# Patient Record
Sex: MALE | Race: WHITE | Employment: UNEMPLOYED | URBAN - METROPOLITAN AREA
[De-identification: names, ages, dates, MRNs, and addresses within clinical notes are randomized per-mention and may not be internally consistent; named-entity substitution may affect disease eponyms.]

---

## 2018-03-31 ENCOUNTER — HOSPITAL ENCOUNTER (EMERGENCY)
Age: 48
Discharge: HOME | End: 2018-03-31
Payer: COMMERCIAL

## 2018-03-31 VITALS
DIASTOLIC BLOOD PRESSURE: 93 MMHG | BODY MASS INDEX: 42.09 KG/M2 | WEIGHT: 285 LBS | RESPIRATION RATE: 16 BRPM | HEART RATE: 87 BPM | TEMPERATURE: 98.2 F | SYSTOLIC BLOOD PRESSURE: 145 MMHG | OXYGEN SATURATION: 95 %

## 2018-03-31 DIAGNOSIS — H66.001 ACUTE SUPPURATIVE OTITIS MEDIA OF RIGHT EAR WITHOUT SPONTANEOUS RUPTURE OF TYMPANIC MEMBRANE, RECURRENCE NOT SPECIFIED: Primary | ICD-10-CM

## 2018-03-31 PROCEDURE — 99212 OFFICE O/P EST SF 10 MIN: CPT

## 2018-03-31 RX ORDER — AZITHROMYCIN 250 MG/1
TABLET, FILM COATED ORAL
Qty: 6 TABLET | Refills: 0 | Status: SHIPPED | OUTPATIENT
Start: 2018-03-31 | End: 2018-04-10

## 2018-05-12 ENCOUNTER — HOSPITAL ENCOUNTER (EMERGENCY)
Age: 48
Discharge: HOME | End: 2018-05-12
Payer: COMMERCIAL

## 2018-05-12 ENCOUNTER — APPOINTMENT (OUTPATIENT)
Dept: GENERAL RADIOLOGY | Age: 48
End: 2018-05-12
Payer: COMMERCIAL

## 2018-05-12 VITALS
WEIGHT: 280 LBS | RESPIRATION RATE: 18 BRPM | TEMPERATURE: 97.7 F | DIASTOLIC BLOOD PRESSURE: 84 MMHG | HEART RATE: 86 BPM | BODY MASS INDEX: 41.47 KG/M2 | HEIGHT: 69 IN | OXYGEN SATURATION: 99 % | SYSTOLIC BLOOD PRESSURE: 125 MMHG

## 2018-05-12 DIAGNOSIS — J20.9 BRONCHOSPASM WITH BRONCHITIS, ACUTE: Primary | ICD-10-CM

## 2018-05-12 PROCEDURE — 99213 OFFICE O/P EST LOW 20 MIN: CPT

## 2018-05-12 PROCEDURE — 94640 AIRWAY INHALATION TREATMENT: CPT

## 2018-05-12 PROCEDURE — 6360000002 HC RX W HCPCS: Performed by: PHYSICIAN ASSISTANT

## 2018-05-12 PROCEDURE — 71046 X-RAY EXAM CHEST 2 VIEWS: CPT

## 2018-05-12 RX ORDER — ALBUTEROL SULFATE 2.5 MG/3ML
2.5 SOLUTION RESPIRATORY (INHALATION) ONCE
Status: COMPLETED | OUTPATIENT
Start: 2018-05-12 | End: 2018-05-12

## 2018-05-12 RX ORDER — GUAIFENESIN AND CODEINE PHOSPHATE 100; 10 MG/5ML; MG/5ML
10 SOLUTION ORAL 3 TIMES DAILY PRN
Qty: 200 ML | Refills: 0 | Status: SHIPPED | OUTPATIENT
Start: 2018-05-12 | End: 2018-05-19

## 2018-05-12 RX ORDER — IBUPROFEN 200 MG
400 TABLET ORAL EVERY 6 HOURS PRN
COMMUNITY
End: 2019-08-25 | Stop reason: ALTCHOICE

## 2018-05-12 RX ORDER — LEVOFLOXACIN 500 MG/1
500 TABLET, FILM COATED ORAL DAILY
Qty: 7 TABLET | Refills: 0 | Status: SHIPPED | OUTPATIENT
Start: 2018-05-12 | End: 2018-05-19

## 2018-05-12 RX ADMIN — ALBUTEROL SULFATE 2.5 MG: 2.5 SOLUTION RESPIRATORY (INHALATION) at 18:39

## 2018-05-12 ASSESSMENT — PAIN DESCRIPTION - DESCRIPTORS: DESCRIPTORS: ACHING;HEADACHE;SORE

## 2018-05-12 ASSESSMENT — PAIN DESCRIPTION - PAIN TYPE: TYPE: ACUTE PAIN

## 2018-05-12 ASSESSMENT — PAIN SCALES - GENERAL: PAINLEVEL_OUTOF10: 6

## 2018-05-12 ASSESSMENT — PAIN DESCRIPTION - LOCATION: LOCATION: HEAD;THROAT;GENERALIZED

## 2018-05-12 ASSESSMENT — PAIN DESCRIPTION - FREQUENCY: FREQUENCY: CONTINUOUS

## 2018-05-12 ASSESSMENT — PAIN DESCRIPTION - ONSET: ONSET: GRADUAL

## 2018-05-12 ASSESSMENT — PAIN DESCRIPTION - PROGRESSION: CLINICAL_PROGRESSION: GRADUALLY WORSENING

## 2018-06-12 ENCOUNTER — HOSPITAL ENCOUNTER (EMERGENCY)
Age: 48
Discharge: HOME | End: 2018-06-12
Payer: COMMERCIAL

## 2018-06-12 VITALS
TEMPERATURE: 97.8 F | BODY MASS INDEX: 41.47 KG/M2 | RESPIRATION RATE: 20 BRPM | DIASTOLIC BLOOD PRESSURE: 110 MMHG | OXYGEN SATURATION: 96 % | SYSTOLIC BLOOD PRESSURE: 157 MMHG | HEART RATE: 92 BPM | WEIGHT: 280 LBS | HEIGHT: 69 IN

## 2018-06-12 DIAGNOSIS — H66.91 RIGHT OTITIS MEDIA, UNSPECIFIED OTITIS MEDIA TYPE: Primary | ICD-10-CM

## 2018-06-12 DIAGNOSIS — J20.9 ACUTE BRONCHITIS, UNSPECIFIED ORGANISM: ICD-10-CM

## 2018-06-12 PROCEDURE — 99212 OFFICE O/P EST SF 10 MIN: CPT

## 2018-06-12 RX ORDER — PREDNISONE 10 MG/1
TABLET ORAL
Qty: 20 TABLET | Refills: 0 | Status: SHIPPED | OUTPATIENT
Start: 2018-06-12 | End: 2018-09-18 | Stop reason: ALTCHOICE

## 2018-06-12 RX ORDER — AZITHROMYCIN 250 MG/1
TABLET, FILM COATED ORAL
Qty: 1 PACKET | Refills: 0 | Status: SHIPPED | OUTPATIENT
Start: 2018-06-12 | End: 2018-06-22

## 2018-06-12 RX ORDER — BENZONATATE 200 MG/1
200 CAPSULE ORAL 3 TIMES DAILY PRN
Qty: 15 CAPSULE | Refills: 0 | Status: SHIPPED | OUTPATIENT
Start: 2018-06-12 | End: 2018-11-23 | Stop reason: ALTCHOICE

## 2018-06-12 ASSESSMENT — PAIN DESCRIPTION - ONSET: ONSET: GRADUAL

## 2018-06-12 ASSESSMENT — PAIN DESCRIPTION - LOCATION: LOCATION: EAR

## 2018-06-12 ASSESSMENT — PAIN SCALES - GENERAL: PAINLEVEL_OUTOF10: 8

## 2018-06-12 ASSESSMENT — PAIN DESCRIPTION - PAIN TYPE: TYPE: ACUTE PAIN

## 2018-06-12 ASSESSMENT — PAIN DESCRIPTION - DESCRIPTORS: DESCRIPTORS: ACHING

## 2018-06-12 ASSESSMENT — PAIN DESCRIPTION - FREQUENCY: FREQUENCY: CONTINUOUS

## 2018-06-12 ASSESSMENT — PAIN DESCRIPTION - ORIENTATION: ORIENTATION: RIGHT

## 2018-06-12 ASSESSMENT — PAIN DESCRIPTION - PROGRESSION: CLINICAL_PROGRESSION: GRADUALLY WORSENING

## 2018-09-18 ENCOUNTER — HOSPITAL ENCOUNTER (EMERGENCY)
Age: 48
Discharge: HOME | End: 2018-09-18
Payer: COMMERCIAL

## 2018-09-18 VITALS
OXYGEN SATURATION: 96 % | SYSTOLIC BLOOD PRESSURE: 131 MMHG | HEART RATE: 75 BPM | WEIGHT: 285 LBS | TEMPERATURE: 98.2 F | RESPIRATION RATE: 20 BRPM | DIASTOLIC BLOOD PRESSURE: 88 MMHG | BODY MASS INDEX: 42.09 KG/M2

## 2018-09-18 DIAGNOSIS — J02.9 SORE THROAT: ICD-10-CM

## 2018-09-18 DIAGNOSIS — H66.91 RIGHT OTITIS MEDIA, UNSPECIFIED OTITIS MEDIA TYPE: Primary | ICD-10-CM

## 2018-09-18 PROCEDURE — 99212 OFFICE O/P EST SF 10 MIN: CPT

## 2018-09-18 RX ORDER — AZITHROMYCIN 250 MG/1
TABLET, FILM COATED ORAL
Qty: 1 PACKET | Refills: 0 | Status: SHIPPED | OUTPATIENT
Start: 2018-09-18 | End: 2018-09-28

## 2018-09-18 RX ORDER — PREDNISONE 20 MG/1
TABLET ORAL
Qty: 10 TABLET | Refills: 0 | Status: SHIPPED | OUTPATIENT
Start: 2018-09-18 | End: 2018-11-23 | Stop reason: ALTCHOICE

## 2018-09-18 ASSESSMENT — PAIN SCALES - GENERAL: PAINLEVEL_OUTOF10: 6

## 2018-09-18 ASSESSMENT — PAIN DESCRIPTION - LOCATION: LOCATION: THROAT;EAR

## 2018-11-23 ENCOUNTER — HOSPITAL ENCOUNTER (EMERGENCY)
Age: 48
Discharge: HOME | End: 2018-11-23
Payer: COMMERCIAL

## 2018-11-23 VITALS
RESPIRATION RATE: 20 BRPM | DIASTOLIC BLOOD PRESSURE: 67 MMHG | WEIGHT: 285 LBS | BODY MASS INDEX: 42.21 KG/M2 | HEART RATE: 79 BPM | SYSTOLIC BLOOD PRESSURE: 135 MMHG | HEIGHT: 69 IN | TEMPERATURE: 97.8 F | OXYGEN SATURATION: 97 %

## 2018-11-23 DIAGNOSIS — J02.9 ACUTE PHARYNGITIS, UNSPECIFIED ETIOLOGY: Primary | ICD-10-CM

## 2018-11-23 PROCEDURE — 99212 OFFICE O/P EST SF 10 MIN: CPT

## 2018-11-23 RX ORDER — PREDNISONE 1 MG/1
5 TABLET ORAL DAILY
COMMUNITY

## 2018-11-23 RX ORDER — AMOXICILLIN 500 MG/1
500 CAPSULE ORAL 3 TIMES DAILY
Qty: 30 CAPSULE | Refills: 0 | Status: SHIPPED | OUTPATIENT
Start: 2018-11-23 | End: 2018-12-03

## 2018-11-23 ASSESSMENT — PAIN DESCRIPTION - FREQUENCY: FREQUENCY: CONTINUOUS

## 2018-11-23 ASSESSMENT — PAIN DESCRIPTION - ORIENTATION: ORIENTATION: RIGHT

## 2018-11-23 ASSESSMENT — PAIN DESCRIPTION - PAIN TYPE: TYPE: ACUTE PAIN

## 2018-11-23 ASSESSMENT — PAIN DESCRIPTION - PROGRESSION: CLINICAL_PROGRESSION: GRADUALLY WORSENING

## 2018-11-23 ASSESSMENT — PAIN DESCRIPTION - ONSET: ONSET: GRADUAL

## 2018-11-23 ASSESSMENT — PAIN SCALES - GENERAL: PAINLEVEL_OUTOF10: 7

## 2018-11-23 ASSESSMENT — PAIN DESCRIPTION - LOCATION: LOCATION: THROAT;HEAD;NECK

## 2019-08-25 ENCOUNTER — HOSPITAL ENCOUNTER (EMERGENCY)
Age: 49
Discharge: HOME | End: 2019-08-25
Payer: COMMERCIAL

## 2019-08-25 VITALS
SYSTOLIC BLOOD PRESSURE: 134 MMHG | OXYGEN SATURATION: 97 % | WEIGHT: 295 LBS | BODY MASS INDEX: 42.23 KG/M2 | TEMPERATURE: 97.7 F | DIASTOLIC BLOOD PRESSURE: 86 MMHG | HEART RATE: 84 BPM | HEIGHT: 70 IN | RESPIRATION RATE: 20 BRPM

## 2019-08-25 DIAGNOSIS — H60.501 ACUTE OTITIS EXTERNA OF RIGHT EAR, UNSPECIFIED TYPE: Primary | ICD-10-CM

## 2019-08-25 LAB — STREP GRP A PCR: NEGATIVE

## 2019-08-25 PROCEDURE — 99212 OFFICE O/P EST SF 10 MIN: CPT

## 2019-08-25 PROCEDURE — 87880 STREP A ASSAY W/OPTIC: CPT

## 2019-08-25 RX ORDER — NEOMYCIN SULFATE, POLYMYXIN B SULFATE, HYDROCORTISONE 3.5; 10000; 1 MG/ML; [USP'U]/ML; MG/ML
4 SOLUTION/ DROPS AURICULAR (OTIC) 4 TIMES DAILY
Qty: 1 BOTTLE | Refills: 0 | Status: SHIPPED | OUTPATIENT
Start: 2019-08-25 | End: 2019-09-01

## 2019-08-25 ASSESSMENT — PAIN DESCRIPTION - DESCRIPTORS: DESCRIPTORS: ACHING;SORE

## 2019-08-25 ASSESSMENT — PAIN DESCRIPTION - ONSET: ONSET: GRADUAL

## 2019-08-25 ASSESSMENT — PAIN DESCRIPTION - LOCATION: LOCATION: FACE;EAR;THROAT

## 2019-08-25 ASSESSMENT — PAIN SCALES - GENERAL: PAINLEVEL_OUTOF10: 8

## 2019-08-25 ASSESSMENT — PAIN DESCRIPTION - FREQUENCY: FREQUENCY: CONTINUOUS

## 2019-08-25 ASSESSMENT — PAIN DESCRIPTION - PAIN TYPE: TYPE: ACUTE PAIN

## 2019-08-25 ASSESSMENT — PAIN DESCRIPTION - PROGRESSION: CLINICAL_PROGRESSION: GRADUALLY WORSENING

## 2019-08-25 ASSESSMENT — PAIN DESCRIPTION - ORIENTATION: ORIENTATION: RIGHT

## 2019-08-25 NOTE — ED PROVIDER NOTES
deficit. Coordination and gait normal. GCS eye subscore is 4. GCS verbal subscore is 5. GCS motor subscore is 6. Skin: Skin is warm and dry. No abrasion and no rash noted. Nursing note and vitals reviewed. Procedures    [facility]       --------------------------------------------- PAST HISTORY ---------------------------------------------  Past Medical History:  has a past medical history of Asthma, Broken finger, COPD (chronic obstructive pulmonary disease) ([facility] [address] [facility] [facility] Hyperlipidemia, Hypertension, IgA nephropathy, Kidney damage, MRSA infection, and Thyroid disease. Past Surgical History:  has a past surgical history that includes Leg amputation, lower tibia/fibula; Abdomen surgery; and Bariatric Surgery. Social History:  reports that he quit smoking about 12 years ago. He has never used smokeless tobacco. He reports that he does not drink alcohol or use drugs. Family History: family history includes Heart Disease in his father. The patients home medications have been reviewed. Allergies: Bee venom and Adhesive tape    -------------------------------------------------- RESULTS -------------------------------------------------  No results found for this visit on 08/25/19. No orders to display       ------------------------- NURSING NOTES AND VITALS REVIEWED ---------------------------   The nursing notes within the ED encounter and vital signs as below have been reviewed. /86   Pulse 84   Temp 97.7 °F (36.5 °C) (Oral)   Resp 20   Ht 5' 10\" (1.778 m)   Wt 295 lb (133.8 kg)   SpO2 97%   BMI 42.33 kg/m²   Oxygen Saturation Interpretation: Normal      ------------------------------------------ PROGRESS NOTES ------------------------------------------   I have spoken with the patient and discussed todays results, in addition to providing specific details for the plan of care and counseling regarding the diagnosis and prognosis.   Their questions are answered at this time

## 2020-03-06 ENCOUNTER — APPOINTMENT (OUTPATIENT)
Dept: GENERAL RADIOLOGY | Age: 50
End: 2020-03-06
Payer: COMMERCIAL

## 2020-03-06 ENCOUNTER — HOSPITAL ENCOUNTER (EMERGENCY)
Age: 50
Discharge: HOME | End: 2020-03-06
Payer: COMMERCIAL

## 2020-03-06 VITALS
BODY MASS INDEX: 43.05 KG/M2 | DIASTOLIC BLOOD PRESSURE: 79 MMHG | SYSTOLIC BLOOD PRESSURE: 128 MMHG | HEART RATE: 96 BPM | RESPIRATION RATE: 18 BRPM | OXYGEN SATURATION: 93 % | TEMPERATURE: 98.4 F | WEIGHT: 300 LBS

## 2020-03-06 LAB
INFLUENZA A BY PCR: DETECTED
INFLUENZA B BY PCR: NOT DETECTED

## 2020-03-06 PROCEDURE — 87804 INFLUENZA ASSAY W/OPTIC: CPT

## 2020-03-06 PROCEDURE — 71046 X-RAY EXAM CHEST 2 VIEWS: CPT

## 2020-03-06 PROCEDURE — 99212 OFFICE O/P EST SF 10 MIN: CPT

## 2020-03-06 PROCEDURE — 87502 INFLUENZA DNA AMP PROBE: CPT

## 2020-03-06 PROCEDURE — 6370000000 HC RX 637 (ALT 250 FOR IP): Performed by: NURSE PRACTITIONER

## 2020-03-06 RX ORDER — CEFUROXIME AXETIL 500 MG/1
500 TABLET ORAL 2 TIMES DAILY
Qty: 20 TABLET | Refills: 0 | Status: SHIPPED | OUTPATIENT
Start: 2020-03-06 | End: 2020-03-16

## 2020-03-06 RX ORDER — IPRATROPIUM BROMIDE AND ALBUTEROL SULFATE 2.5; .5 MG/3ML; MG/3ML
1 SOLUTION RESPIRATORY (INHALATION) ONCE
Status: COMPLETED | OUTPATIENT
Start: 2020-03-06 | End: 2020-03-06

## 2020-03-06 RX ORDER — DOXYCYCLINE HYCLATE 100 MG
100 TABLET ORAL 2 TIMES DAILY
Qty: 20 TABLET | Refills: 0 | Status: SHIPPED | OUTPATIENT
Start: 2020-03-06 | End: 2020-03-16

## 2020-03-06 RX ORDER — OSELTAMIVIR PHOSPHATE 75 MG/1
75 CAPSULE ORAL 2 TIMES DAILY
Qty: 10 CAPSULE | Refills: 0 | Status: SHIPPED | OUTPATIENT
Start: 2020-03-06 | End: 2020-03-11

## 2020-03-06 RX ADMIN — IPRATROPIUM BROMIDE AND ALBUTEROL SULFATE 1 AMPULE: .5; 3 SOLUTION RESPIRATORY (INHALATION) at 09:43

## 2020-03-06 NOTE — ED PROVIDER NOTES
Department of Emergency Medicine  [address] [place] [address]  Provider Note  Admit Date/Time: 3/6/2020  9:14 AM  Room: 02/02  MRN: [id number]  Chief Complaint: Cough (Pt c/o cough, chest congestion, and pressure to L ear, states he has been treated by his PCP with [facility], and he is on day 5 of Amoxicillin all within the last 10 days, states he is not any better); Chest Congestion; and Otalgia       History of Present Illness   Source of history provided by:  patient. History/Exam Limitations: none. Venecia Dasilva is a 51 y.o. male who has a past medical history of:   Past Medical History:   Diagnosis Date    Asthma     Broken finger     [facility] (chronic obstructive [facility] disease) ([facility]     Hx of BKA ([facility]     Hyperlipidemia     Hypertension     IgA nephropathy     Kidney damage     MRSA infection     Thyroid disease     presents to the urgent care center by private car for evaluation. He reports that he has COPD. He has   Inhalers to use at home. He reports that he has been sick for over 10 days. With fever chills body aches chest congestion cough and nasal congestion. He was on a Z-Eyal and he was on the on amoxicillin and nothing has  helped him. HIs left ear is very painful    ROS    Pertinent positives and negatives are stated within HPI, all other systems reviewed and are negative. Past Surgical History:   Procedure Laterality Date    ABDOMEN SURGERY      BARIATRIC SURGERY      LEG AMPUTATION THROUGH LOWER TIBIA AND FIBULA     Social History:  reports that he quit smoking about 13 years ago. He has never used smokeless tobacco. He reports that he does not drink alcohol or use drugs. Family History: family history includes Heart Disease in his father.   Allergies: Bee venom and Adhesive tape    Physical Exam   Oxygen Saturation Interpretation: Normal.   ED Triage Vitals [03/06/20 0923]   BP Temp Temp Source Pulse Resp SpO2 Height Weight   128/79 98.4 °F (36.9 °C) Oral 96 18 93 % -- 300 lb (136.1 kg)       Physical Exam  · Constitutional/General: Alert and oriented x3,non toxic in NAD  · HEENT:  NC/NT. Clear conjunctiva, left TM erythematous and bulging, right is normal, pharynx erythematous. ,  Airway patent. · Neck: Supple, full ROM, non tender to palpation in the midline, no stridor, no crepitus, no meningeal signs  · Respiratory: Lungs Exp wheeze and scattered rhonchi,  Not in respiratory distress  · CV:  Regular rate. Regular rhythm. No murmurs, gallops, or rubs. 2+ distal pulses  · Chest: No chest wall tenderness  · GI:  Abdomen Soft, Non tender, . · Musculoskeletal: Moves all extremities x 4. Integument: skin warm and dry. No rashes. · Lymphatic: no lymphadenopathy noted  · Neurologic: GCS 15, no focal deficits, symmetric strength 5/5 in the upper and lower extremities bilaterally  · Psychiatric: Normal Affect    Lab / Imaging Results   (All laboratory and radiology results have been personally reviewed by myself)  Labs:  Results for orders placed or performed during the hospital encounter of 03/06/20   Rapid influenza A/B antigens   Result Value Ref Range    Influenza A by PCR DETECTED (A) Not Detected    Influenza B by PCR Not Detected Not Detected     Imaging: All Radiology results interpreted by Radiologist unless otherwise noted. XR CHEST STANDARD (2 VW)   Final Result   1. Findings favoring atypical infectious process with a possible lung   nodule in the left lower lung zone. Recommend a follow-up chest CT.            ED Course / Medical Decision Making     Medications   ipratropium-albuterol (DUONEB) nebulizer solution 1 ampule (1 ampule Inhalation Given 3/6/20 0943)            MDM:   Has been sick for over a week with flulike symptoms and not getting better he is coughing he is congested he has some wheezing and rhonchi I did do a DuoNeb for him his sats 93% he said with his COPD he typically runs 93-94 this not that is his usual.  I did do an influenza and OF ED PROVIDER NOTE     Ghazal Dean, APRN - CNP  03/06/20 1640

## 2021-02-20 ENCOUNTER — IMMUNIZATION (OUTPATIENT)
Dept: PRIMARY CARE CLINIC | Age: 51
End: 2021-02-20
Payer: COMMERCIAL

## 2021-02-20 PROCEDURE — 91300 COVID-19, PFIZER VACCINE 30MCG/0.3ML DOSE: CPT | Performed by: NURSE PRACTITIONER

## 2021-02-20 PROCEDURE — 0001A COVID-19, PFIZER VACCINE 30MCG/0.3ML DOSE: CPT | Performed by: NURSE PRACTITIONER

## 2021-03-15 ENCOUNTER — IMMUNIZATION (OUTPATIENT)
Dept: PRIMARY CARE CLINIC | Age: 51
End: 2021-03-15
Payer: COMMERCIAL

## 2021-03-15 PROCEDURE — 0002A COVID-19, PFIZER VACCINE 30MCG/0.3ML DOSE: CPT | Performed by: NURSE PRACTITIONER

## 2021-03-15 PROCEDURE — 91300 COVID-19, PFIZER VACCINE 30MCG/0.3ML DOSE: CPT | Performed by: NURSE PRACTITIONER

## 2021-06-08 ENCOUNTER — HOSPITAL ENCOUNTER (EMERGENCY)
Age: 51
Discharge: HOME | End: 2021-06-08
Payer: COMMERCIAL

## 2021-06-08 VITALS
TEMPERATURE: 98.6 F | HEIGHT: 70 IN | SYSTOLIC BLOOD PRESSURE: 121 MMHG | DIASTOLIC BLOOD PRESSURE: 82 MMHG | HEART RATE: 98 BPM | WEIGHT: 310 LBS | RESPIRATION RATE: 20 BRPM | BODY MASS INDEX: 44.38 KG/M2 | OXYGEN SATURATION: 96 %

## 2021-06-08 DIAGNOSIS — L25.5 RHUS DERMATITIS: Primary | ICD-10-CM

## 2021-06-08 PROCEDURE — 6360000002 HC RX W HCPCS: Performed by: PHYSICIAN ASSISTANT

## 2021-06-08 PROCEDURE — 96372 THER/PROPH/DIAG INJ SC/IM: CPT

## 2021-06-08 PROCEDURE — 99212 OFFICE O/P EST SF 10 MIN: CPT

## 2021-06-08 RX ORDER — DEXAMETHASONE SODIUM PHOSPHATE 10 MG/ML
10 INJECTION, SOLUTION INTRAMUSCULAR; INTRAVENOUS ONCE
Status: COMPLETED | OUTPATIENT
Start: 2021-06-08 | End: 2021-06-08

## 2021-06-08 RX ORDER — METHYLPREDNISOLONE ACETATE 40 MG/ML
40 INJECTION, SUSPENSION INTRA-ARTICULAR; INTRALESIONAL; INTRAMUSCULAR; SOFT TISSUE ONCE
Status: DISCONTINUED | OUTPATIENT
Start: 2021-06-08 | End: 2021-06-08

## 2021-06-08 RX ADMIN — DEXAMETHASONE SODIUM PHOSPHATE 10 MG: 10 INJECTION, SOLUTION INTRAMUSCULAR; INTRAVENOUS at 18:54

## 2021-06-08 ASSESSMENT — VISUAL ACUITY: OU: 1

## 2021-06-08 NOTE — ED PROVIDER NOTES
[address] [facility]  Department of Emergency Medicine  UC Encounter Note  21   6:34 PM EDT      NAME: Allie Rutledge  :  1970  MRN:  [id number]    Chief Complaint: Rash (started  today  with rash getting  into eyes)      This is a 64-year-old male the presents to urgent care complaining of some left upper eyelid swelling. He states he may have touched some poison ivy today and noticed some swelling in the left upper eyelid. He states that he is susceptible to getting poison ivy type rashes. He denies any severe itching at this time. No loss of vision or blurred vision. On first contact patient he appears to be in no acute distress. Review of Systems  Pertinent positives and negatives are stated within HPI, all other systems reviewed and are negative. Physical Exam  Vitals and nursing note reviewed. Constitutional:       Appearance: He is well-developed. HENT:      Head: Normocephalic and atraumatic. Jaw: No trismus. Right Ear: Hearing, tympanic membrane, ear canal and external ear normal.      Left Ear: Hearing, tympanic membrane, ear canal and external ear normal.      Nose: Nose normal.      Right Sinus: No maxillary sinus tenderness or frontal sinus tenderness. Left Sinus: No maxillary sinus tenderness or frontal sinus tenderness. Mouth/Throat:      Pharynx: Uvula midline. No uvula swelling. Eyes:      General: Vision grossly intact. Gaze aligned appropriately. No allergic shiner, visual field deficit or scleral icterus. Right eye: No foreign body, discharge or hordeolum. Left eye: No foreign body, discharge or hordeolum. Conjunctiva/sclera: Conjunctivae normal.      Right eye: Right conjunctiva is not injected. No chemosis. Left eye: Left conjunctiva is not injected. No chemosis. Pupils: Pupils are equal, round, and reactive to light.       Comments: Left upper eyelid is mildly swollen I do not see much of a rash there at all I do not see any other rash. Cardiovascular:      Rate and Rhythm: Normal rate and regular rhythm. Heart sounds: Normal heart sounds. No murmur heard. Pulmonary:      Effort: Pulmonary effort is normal.      Breath sounds: Normal breath sounds. Abdominal:      General: Bowel sounds are normal.      Palpations: Abdomen is soft. Abdomen is not rigid. Tenderness: There is no abdominal tenderness. There is no guarding or rebound. Musculoskeletal:      Cervical back: Normal range of motion and neck supple. Skin:     General: Skin is warm and dry. Findings: No abrasion or rash. Neurological:      Mental Status: He is alert and oriented to person, place, and time. GCS: GCS eye subscore is 4. GCS verbal subscore is 5. GCS motor subscore is 6. Cranial Nerves: No cranial nerve deficit. Sensory: No sensory deficit. Coordination: Coordination normal.      Gait: Gait normal.         Procedures    MDM  Number of Diagnoses or Management Options  Rhus dermatitis  Diagnosis management comments: Give dose of Decadron here. Patient is on steroids daily and have him continue taking that. If he does get a rash on non-facial areas of his body he can use topical steroids. Take Benadryl or antihistamines for itching. Return if symptoms worsen. --------------------------------------------- PAST HISTORY ---------------------------------------------  Past Medical History:  has a past medical history of Asthma, Broken finger, COPD (chronic obstructive pulmonary disease) ([facility] [facility] Hyperlipidemia, Hypertension, IgA nephropathy, Kidney damage, MRSA infection, and Thyroid disease. Past Surgical History:  has a past surgical history that includes Leg amputation, lower tibia/fibula; Abdomen surgery; and Bariatric Surgery. Social History:  reports that he quit smoking about 14 years ago.  He has never used smokeless tobacco. He reports that he does not drink alcohol and does not use drugs. Family History: family history includes Heart Disease in his father. The patients home medications have been reviewed. Allergies: Bee venom and Adhesive tape    -------------------------------------------------- RESULTS -------------------------------------------------  No results found for this visit on 06/08/21. No orders to display       ------------------------- NURSING NOTES AND VITALS REVIEWED ---------------------------   The nursing notes within the ED encounter and vital signs as below have been reviewed. /82   Pulse 98   Temp 98.6 °F (37 °C) (Infrared)   Resp 20   Ht 5' 10\" (1.778 m)   Wt (!) 310 lb (140.6 kg)   SpO2 96%   BMI 44.48 kg/m²   Oxygen Saturation Interpretation: Normal      ------------------------------------------ PROGRESS NOTES ------------------------------------------   I have spoken with the patient and discussed todays results, in addition to providing specific details for the plan of care and counseling regarding the diagnosis and prognosis. Their questions are answered at this time and they are agreeable with the plan.      --------------------------------- ADDITIONAL PROVIDER NOTES ---------------------------------     This patient is stable for discharge. I have shared the specific conditions for return, as well as the importance of follow-up. * NOTE: This report was transcribed using voice recognition software. Every effort was made to ensure accuracy; however, inadvertent computerized transcription errors may be present.    --------------------------------- IMPRESSION AND DISPOSITION ---------------------------------    IMPRESSION  1.  Rhus dermatitis        DISPOSITION  Disposition: Discharge to home  Patient condition is good        Crystal Dudley PA-C  06/08/21 5807

## 2021-12-09 ENCOUNTER — IMMUNIZATION (OUTPATIENT)
Dept: PRIMARY CARE CLINIC | Age: 51
End: 2021-12-09
Payer: COMMERCIAL

## 2021-12-09 PROCEDURE — 0004A COVID-19, PFIZER VACCINE 30MCG/0.3ML DOSE: CPT | Performed by: NURSE PRACTITIONER

## 2021-12-09 PROCEDURE — 91300 COVID-19, PFIZER VACCINE 30MCG/0.3ML DOSE: CPT | Performed by: NURSE PRACTITIONER

## 2022-02-28 ENCOUNTER — HOSPITAL ENCOUNTER (EMERGENCY)
Age: 52
Discharge: HOME | End: 2022-02-28
Payer: COMMERCIAL

## 2022-02-28 VITALS
RESPIRATION RATE: 20 BRPM | HEIGHT: 70 IN | WEIGHT: 315 LBS | OXYGEN SATURATION: 96 % | HEART RATE: 104 BPM | TEMPERATURE: 99.1 F | DIASTOLIC BLOOD PRESSURE: 95 MMHG | BODY MASS INDEX: 45.1 KG/M2 | SYSTOLIC BLOOD PRESSURE: 163 MMHG

## 2022-02-28 DIAGNOSIS — H00.015 HORDEOLUM EXTERNUM OF LEFT LOWER EYELID: Primary | ICD-10-CM

## 2022-02-28 PROCEDURE — 99211 OFF/OP EST MAY X REQ PHY/QHP: CPT

## 2022-02-28 PROCEDURE — G0463 HOSPITAL OUTPT CLINIC VISIT: HCPCS

## 2022-02-28 RX ORDER — ERYTHROMYCIN 5 MG/G
OINTMENT OPHTHALMIC
Qty: 3.5 G | Refills: 0 | Status: SHIPPED | OUTPATIENT
Start: 2022-02-28 | End: 2022-03-10

## 2022-02-28 ASSESSMENT — PAIN DESCRIPTION - ONSET: ONSET: GRADUAL

## 2022-02-28 ASSESSMENT — PAIN DESCRIPTION - ORIENTATION: ORIENTATION: LEFT

## 2022-02-28 ASSESSMENT — PAIN SCALES - GENERAL: PAINLEVEL_OUTOF10: 7

## 2022-02-28 ASSESSMENT — PAIN - FUNCTIONAL ASSESSMENT: PAIN_FUNCTIONAL_ASSESSMENT: 0-10

## 2022-02-28 ASSESSMENT — PAIN DESCRIPTION - PROGRESSION: CLINICAL_PROGRESSION: GRADUALLY WORSENING

## 2022-02-28 ASSESSMENT — PAIN DESCRIPTION - PAIN TYPE: TYPE: ACUTE PAIN

## 2022-02-28 ASSESSMENT — PAIN DESCRIPTION - DESCRIPTORS: DESCRIPTORS: HEADACHE;DISCOMFORT

## 2022-02-28 ASSESSMENT — PAIN DESCRIPTION - LOCATION: LOCATION: EYE;HEAD

## 2022-02-28 ASSESSMENT — PAIN DESCRIPTION - FREQUENCY: FREQUENCY: CONTINUOUS

## 2022-02-28 NOTE — ED PROVIDER NOTES
Department of Emergency Medicine  [facility]  Provider Note  Admit Date/Time: 2022  8:11 AM  Room:   NAME: Rl Chacon  : 1970  MRN: [id number]     Chief Complaint:  Eye Problem (Left eye red with swelling of lower eyelid.) and Headache    History of Present Illness        Rl Chacon is a 49 y.o. male who has a past medical history of:   Past Medical History:   Diagnosis Date    Asthma     Broken finger     [facility] (chronic obstructive [facility] disease) ([facility] [address] [facility]     Hx of BKA ([facility]     Hyperlipidemia     Hypertension     IgA nephropathy     Kidney damage     MRSA infection     Thyroid disease     presents to the urgent care center by private car for relation. He has swelling of the left lower eyelid and some drainage. It started yesterday. There is no change in his vision, eye he is denying any respiratory symptoms he is denying any other complaints. ROS    Pertinent positives and negatives are stated within HPI, all other systems reviewed and are negative. Past Surgical History:   Procedure Laterality Date    ABDOMEN SURGERY      BARIATRIC SURGERY      LEG AMPUTATION THROUGH LOWER TIBIA AND FIBULA     Social History:  reports that he quit smoking about 14 years ago. His smoking use included cigarettes. He has never used smokeless tobacco. He reports that he does not drink alcohol and does not use drugs. Family History: family history includes Heart Disease in his father. Allergies: Bee venom and Adhesive tape    Physical Exam   Oxygen Saturation Interpretation: Normal.   ED Triage Vitals [22 0812]   BP Temp Temp Source Pulse Resp SpO2 Height Weight   (!) 163/95 99.1 °F (37.3 °C) Infrared 104 20 96 % 5' 10\" (1.778 m) (!) 318 lb (144.2 kg)       Physical Exam  · Constitutional/General: Alert and oriented x3, well appearing, non toxic in NAD  · HEENT:  NC/NT. PERRLA,  Airway patent.   Bilateral conjunctiva clear, he does have a stye on the left lower eyelid and there is just a small amount small pinpoint area of yellow drainage on the inner canthus. No pain with EOMs  · Neck: Supple, full ROM,   · Respiratory: Lungs clear to auscultation bilaterally, no wheezes, rales, or rhonchi. Not in respiratory distress  · CV:  Regular rate. Regular rhythm. · Musculoskeletal: Moves all extremities x 4.   · Integument: skin warm and dry. No rashes. · Lymphatic: no lymphadenopathy noted  · Neurologic: GCS 15, no focal deficits, symmetric strength 5/5 in the upper and lower extremities bilaterally  · Psychiatric: Normal Affect    Lab / Imaging Results   (All laboratory and radiology results have been personally reviewed by myself)  Labs:  No results found for this visit on 02/28/22. Imaging: All Radiology results interpreted by Radiologist unless otherwise noted. No orders to display       ED Course / Medical Decision Making   Medications - No data to display       Consult(s):   None      MDM:   Patient here with a stye of the left lower lower eyelid advised him to use warm compresses 4 times a day and I did put him on erythromycin ointment his blood pressure is elevated today he should follow-up with his PCP regarding that      Assessment      1. Hordeolum externum of left lower eyelid      Plan   Discharge to home and advised to contact [facility], [address] [facility]  [phone]    Schedule an appointment as soon as possible for a visit      Patient condition is good    New Medications     New Prescriptions    ERYTHROMYCIN (ROMYCIN) 5 MG/GM OPHTHALMIC OINTMENT    Apply thin layer oint to left eyelid and eye QID for 7 days     Electronically signed by RADHA Moreno CNP   DD: 2/28/22  **This report was transcribed using voice recognition software. Every effort was made to ensure accuracy; however, inadvertent computerized transcription errors may be present.   END OF ED PROVIDER NOTE     Deidra Wyatt RADHA - CNP  02/28/22 6092

## 2022-09-25 ENCOUNTER — HOSPITAL ENCOUNTER (EMERGENCY)
Age: 52
Discharge: HOME | End: 2022-09-25
Payer: COMMERCIAL

## 2022-09-25 VITALS
RESPIRATION RATE: 20 BRPM | DIASTOLIC BLOOD PRESSURE: 102 MMHG | WEIGHT: 274 LBS | TEMPERATURE: 97.6 F | BODY MASS INDEX: 40.58 KG/M2 | HEIGHT: 69 IN | OXYGEN SATURATION: 97 % | HEART RATE: 66 BPM | SYSTOLIC BLOOD PRESSURE: 138 MMHG

## 2022-09-25 DIAGNOSIS — S05.02XA ABRASION OF LEFT CORNEA, INITIAL ENCOUNTER: Primary | ICD-10-CM

## 2022-09-25 PROCEDURE — 6370000000 HC RX 637 (ALT 250 FOR IP): Performed by: PHYSICIAN ASSISTANT

## 2022-09-25 PROCEDURE — 99211 OFF/OP EST MAY X REQ PHY/QHP: CPT

## 2022-09-25 RX ORDER — TETRACAINE HYDROCHLORIDE 5 MG/ML
2 SOLUTION OPHTHALMIC ONCE
Status: COMPLETED | OUTPATIENT
Start: 2022-09-25 | End: 2022-09-25

## 2022-09-25 RX ORDER — TOBRAMYCIN 3 MG/ML
1 SOLUTION/ DROPS OPHTHALMIC EVERY 4 HOURS
Qty: 5 ML | Refills: 0 | Status: SHIPPED | OUTPATIENT
Start: 2022-09-25 | End: 2022-10-05

## 2022-09-25 RX ADMIN — TETRACAINE HYDROCHLORIDE 2 DROP: 5 SOLUTION OPHTHALMIC at 17:12

## 2022-09-25 RX ADMIN — FLUORESCEIN SODIUM 1 EACH: 0.6 STRIP OPHTHALMIC at 17:12

## 2022-09-25 ASSESSMENT — PAIN - FUNCTIONAL ASSESSMENT: PAIN_FUNCTIONAL_ASSESSMENT: NONE - DENIES PAIN

## 2022-09-25 NOTE — ED PROVIDER NOTES
Department of Emergency Medicine   ED  Provider Note  Admit Date/RoomTime: 9/25/2022  4:51 PM  ED Room: 04/04        HPI:  9/25/22, Time: 5:09 PM EDT  . Julia Romero is a 47 y.o. old male presenting to the emergency department with sudden onset discharge, erythema, and foreign body sensation to left eye, which began several hour(s) prior to arrival.  Since onset his symptoms have been persistent and moderate in severity. Associated signs & symptoms of:  none. Mechanism:         FB Exposure:   unknown. Chemical Exposure:   No.     Trauma:   No.     High Speed Machinery Use:   No.     Welding:   No.     Circumstances:    Contact Lens Use:   No.     Recent URI Sx's:   No.     Spontaneous Onset:   No.     Close Contacts w/Similar Sx's:   No.     Work Related:   No.     History of:         Glaucoma:  No.       Post-operative:  No.       Other Eye Disease:  No.  Tetanus Status: within past 5 years. Review of Systems:   Pertinent positives and negatives are stated within HPI, all other systems reviewed and are negative.        --------------------------------------------- PAST HISTORY ---------------------------------------------  Past Medical History:  has a past medical history of Asthma, Broken finger, COPD (chronic obstructive pulmonary disease) ([facility] [address] [facility] Hyperlipidemia, Hypertension, IgA nephropathy, Kidney damage, MRSA infection, and Thyroid disease. Past Surgical History:  has a past surgical history that includes Leg amputation, lower tibia/fibula; Abdomen surgery; and Bariatric Surgery. Social History:  reports that he quit smoking about 14 years ago. His smoking use included cigarettes. He has never used smokeless tobacco. He reports that he does not drink alcohol and does not use drugs. Family History: family history includes Heart Disease in his father. The patients home medications have been reviewed.     Allergies: Bee venom and Adhesive tape        ---------------------------------------------------PHYSICAL EXAM--------------------------------------    Constitutional/General: Alert and oriented x3, well appearing, non toxic in NAD  Head: Normocephalic and atraumatic  Eyes: PERRL, 3 mm, conjunctiva erythematous on the left,  no evidence of hyphema, no evidence of entrapment. No pain with EOM, and EOMI. Direct and consensual light reflex normal.  Fluorescein stain revealed 2 punctate abrasions to the center of the cornea. Mouth: Oropharynx clear, handling secretions, no trismus  Neck: Supple, full ROM, non tender to palpation in the midline, no stridor, no crepitus, no meningeal signs  Musculoskeletal: Moves all extremities x 4. Warm and well perfused  Skin: warm and dry. No rashes. Neurologic: GCS 15  Psych: Normal Affect    -------------------------------------------------- RESULTS -------------------------------------------------  I have personally reviewed all laboratory and imaging results for this patient. Results are listed below. LABS:  No results found for this visit on 09/25/22. RADIOLOGY:  Interpreted by Radiologist.  No orders to display           ------------------------- NURSING NOTES AND VITALS REVIEWED ---------------------------   The nursing notes within the ED encounter and vital signs as below have been reviewed by myself. BP (!) 138/102   Pulse 66   Temp 97.6 °F (36.4 °C) (Infrared)   Resp 20   Ht 5' 9\" (1.753 m)   Wt 274 lb (124.3 kg)   SpO2 97%   BMI 40.46 kg/m²   Oxygen Saturation Interpretation: Normal    The patients available past medical records and past encounters were reviewed.         ------------------------------ ED COURSE/MEDICAL DECISION MAKING----------------------  Medications   fluorescein ophthalmic strip 1 each (has no administration in time range)   tetracaine (TETRAVISC) 0.5 % ophthalmic solution 2 drop (has no administration in time range)         Medical Decision Making:    Patient is a 81-year-old male presenting to urgent care today with pain, discharge, erythema to the left eye. Physical exam findings consistent with corneal abrasion. Tetanus up-to-date. At this time he is nontoxic-appearing, afebrile, no acute distress therefore plan on outpatient management antibiotic drops. Advised follow very closely with PCP for recheck return the emergency department with any new or worsening symptoms. Patient voiced understanding and is agreeable to the above treatment plan. Counseling: The emergency provider has spoken with the patient and discussed todays results, in addition to providing specific details for the plan of care and counseling regarding the diagnosis and prognosis. Questions are answered at this time and they are agreeable with the plan.       --------------------------------- IMPRESSION AND DISPOSITION ---------------------------------    IMPRESSION  1.  Abrasion of left cornea, initial encounter        DISPOSITION  Disposition: Discharge to home  Patient condition is stable            Lula Arndtma  09/25/22 0661

## 2022-10-05 ENCOUNTER — IMMUNIZATION (OUTPATIENT)
Dept: PRIMARY CARE CLINIC | Age: 52
End: 2022-10-05
Payer: COMMERCIAL

## 2022-10-05 DIAGNOSIS — Z23 NEED FOR COVID-19 VACCINE: Primary | ICD-10-CM

## 2022-10-05 PROCEDURE — 91312 COVID-19, PFIZER BIVALENT BOOSTER, (AGE 12Y+), IM, 30 MCG/0.3 ML: CPT | Performed by: FAMILY MEDICINE

## 2023-11-06 DIAGNOSIS — E23.0 CENTRAL HYPOGONADISM (MULTI): ICD-10-CM

## 2023-11-06 RX ORDER — LEVOTHYROXINE SODIUM 100 UG/1
100 TABLET ORAL
COMMUNITY
Start: 2021-05-25 | End: 2023-11-06 | Stop reason: SDUPTHER

## 2023-11-06 RX ORDER — LEVOTHYROXINE SODIUM 100 UG/1
100 TABLET ORAL
Qty: 90 TABLET | Refills: 3 | Status: SHIPPED | OUTPATIENT
Start: 2023-11-06 | End: 2023-11-16 | Stop reason: SDUPTHER

## 2023-11-06 NOTE — TELEPHONE ENCOUNTER
Pharmacy requesting refill of levothyroxine 100mcg for Bryan Myers  Patient has follow uip appointment with provider 11/16/2023. Marisa Miller LPN

## 2023-11-16 ENCOUNTER — TELEMEDICINE (OUTPATIENT)
Dept: ENDOCRINOLOGY | Facility: CLINIC | Age: 53
End: 2023-11-16
Payer: COMMERCIAL

## 2023-11-16 DIAGNOSIS — E03.9 HYPOTHYROIDISM, UNSPECIFIED TYPE: ICD-10-CM

## 2023-11-16 DIAGNOSIS — E29.1 HYPOGONADISM MALE: Primary | ICD-10-CM

## 2023-11-16 DIAGNOSIS — E23.0 CENTRAL HYPOGONADISM (MULTI): ICD-10-CM

## 2023-11-16 PROCEDURE — 99215 OFFICE O/P EST HI 40 MIN: CPT | Performed by: INTERNAL MEDICINE

## 2023-11-16 RX ORDER — POLYETHYLENE GLYCOL 3350, SODIUM SULFATE ANHYDROUS, SODIUM BICARBONATE, SODIUM CHLORIDE, POTASSIUM CHLORIDE 236; 22.74; 6.74; 5.86; 2.97 G/4L; G/4L; G/4L; G/4L; G/4L
POWDER, FOR SOLUTION ORAL
COMMUNITY
Start: 2023-10-30

## 2023-11-16 RX ORDER — LORATADINE 10 MG/1
10 TABLET ORAL
COMMUNITY
Start: 2020-09-28

## 2023-11-16 RX ORDER — AMITRIPTYLINE HYDROCHLORIDE 25 MG/1
1 TABLET, FILM COATED ORAL NIGHTLY
COMMUNITY
Start: 2022-09-21

## 2023-11-16 RX ORDER — DAPAGLIFLOZIN 10 MG/1
1 TABLET, FILM COATED ORAL
COMMUNITY
Start: 2023-09-14

## 2023-11-16 RX ORDER — FLUTICASONE PROPIONATE 50 MCG
2 SPRAY, SUSPENSION (ML) NASAL DAILY
COMMUNITY

## 2023-11-16 RX ORDER — FLUTICASONE PROPIONATE AND SALMETEROL XINAFOATE 230; 21 UG/1; UG/1
AEROSOL, METERED RESPIRATORY (INHALATION)
COMMUNITY
Start: 2023-05-15

## 2023-11-16 RX ORDER — PREDNISONE 5 MG/1
5 TABLET ORAL 2 TIMES DAILY
Qty: 200 TABLET | Refills: 3 | Status: SHIPPED | OUTPATIENT
Start: 2023-11-16

## 2023-11-16 RX ORDER — TRIAMCINOLONE ACETONIDE 5 MG/G
CREAM TOPICAL
COMMUNITY
Start: 2020-10-08

## 2023-11-16 RX ORDER — FUROSEMIDE 20 MG/1
1 TABLET ORAL AS NEEDED
COMMUNITY
Start: 2014-12-18

## 2023-11-16 RX ORDER — LIDOCAINE AND PRILOCAINE 25; 25 MG/G; MG/G
CREAM TOPICAL
COMMUNITY
Start: 2020-05-12

## 2023-11-16 RX ORDER — MONTELUKAST SODIUM 10 MG/1
1 TABLET ORAL NIGHTLY
COMMUNITY

## 2023-11-16 RX ORDER — PREDNISONE 5 MG/1
5 TABLET ORAL 2 TIMES DAILY
COMMUNITY
Start: 2020-06-05 | End: 2023-11-16 | Stop reason: SDUPTHER

## 2023-11-16 RX ORDER — LEVOTHYROXINE SODIUM 100 UG/1
300 TABLET ORAL
Qty: 270 TABLET | Refills: 3 | Status: SHIPPED | OUTPATIENT
Start: 2023-11-16

## 2023-11-16 RX ORDER — EPINEPHRINE 0.3 MG/.3ML
INJECTION SUBCUTANEOUS
COMMUNITY
Start: 2015-08-25

## 2023-11-16 RX ORDER — IBUPROFEN 200 MG
1 CAPSULE ORAL DAILY
COMMUNITY

## 2023-11-16 RX ORDER — LOSARTAN POTASSIUM 100 MG/1
100 TABLET ORAL
COMMUNITY
Start: 2023-09-14

## 2023-11-16 RX ORDER — IPRATROPIUM BROMIDE 21 UG/1
SPRAY, METERED NASAL
COMMUNITY
Start: 2019-12-10

## 2023-11-16 RX ORDER — BUDESONIDE AND FORMOTEROL FUMARATE DIHYDRATE 160; 4.5 UG/1; UG/1
2 AEROSOL RESPIRATORY (INHALATION) 2 TIMES DAILY
COMMUNITY

## 2023-11-16 RX ORDER — MULTIVIT-MIN/IRON FUM/FOLIC AC 7.5 MG-4
TABLET ORAL
COMMUNITY

## 2023-11-16 RX ORDER — DICLOFENAC SODIUM 10 MG/G
2 GEL TOPICAL 4 TIMES DAILY
COMMUNITY
Start: 2020-05-12

## 2023-11-16 RX ORDER — OMEGA-3-ACID ETHYL ESTERS 1 G/1
2 CAPSULE, LIQUID FILLED ORAL 2 TIMES DAILY
COMMUNITY
Start: 2021-03-05

## 2023-11-16 RX ORDER — LIDOCAINE 50 MG/G
PATCH TOPICAL
COMMUNITY
Start: 2021-03-05

## 2023-11-16 RX ORDER — TESTOSTERONE CYPIONATE 200 MG/ML
200 INJECTION, SOLUTION INTRAMUSCULAR
Qty: 6 ML | Refills: 1 | Status: SHIPPED | OUTPATIENT
Start: 2023-11-16 | End: 2024-02-14

## 2023-11-16 RX ORDER — DIPHENHYDRAMINE HCL 25 MG
CAPSULE ORAL
COMMUNITY
Start: 2020-10-08

## 2023-11-16 RX ORDER — ACETAMINOPHEN 160 MG/5ML
650 SOLUTION ORAL EVERY 4 HOURS PRN
COMMUNITY

## 2023-11-16 RX ORDER — DOXYCYCLINE 100 MG/1
100 CAPSULE ORAL 2 TIMES DAILY
COMMUNITY
Start: 2023-11-02

## 2023-11-16 RX ORDER — ALBUTEROL SULFATE 0.83 MG/ML
SOLUTION RESPIRATORY (INHALATION)
COMMUNITY
Start: 2020-07-08

## 2023-11-16 RX ORDER — TESTOSTERONE CYPIONATE 200 MG/ML
INJECTION, SOLUTION INTRAMUSCULAR
COMMUNITY
Start: 2021-05-27 | End: 2024-03-28 | Stop reason: ALTCHOICE

## 2023-11-16 RX ORDER — SILDENAFIL 50 MG/1
TABLET, FILM COATED ORAL
COMMUNITY

## 2023-11-16 NOTE — PROGRESS NOTES
"OARRS:  Esvin Bills MD on 11/16/2023  9:53 AM  I have personally reviewed the OARRS report for Bryan Myers. I have considered the risks of abuse, dependence, addiction and diversion    Is the patient prescribed a combination of a benzodiazepine and opioid?  Yes, I feel it is clincially indicated to continue the medication and have discussed with the patient risks/benefits/alternatives.    Last Urine Drug Screen / ordered today: No  No results found for this or any previous visit (from the past 8760 hour(s)).  N/A    Clinical rationale for not completing a Urine Drug Screen: Patient prescribed only an antidiarrheal, anorexiant, or testosterone      Controlled Substance Agreement:  Date of the Last Agreement: 11/16/2023    Reviewed Controlled Substance Agreement including but not limited to the benefits, risks, and alternatives to treatment with a Controlled Substance medication(s).    Testosterone:  What is the patient's goal of therapy? Normal T levels   Is this being achieved with current treatment? Yes  due for labs order placed     I attest the patient does not have: Breast Cancer, Polycythemia, or Prostate Cancer    Last Testosterone check:  No results found for: \"TESTF\", \"TESTOST\", \"TSTFREERATIO\", \"TESTOSTERONE\", \"TESTOTOTMS\"    Last CBC:    No results found for: \"CBCDIF\", \"BMCBC\", \"PR1\"    Last PSA:   No results found for: \"PSA\", \"PSAU\", \"KPSAF\", \"KPSAP\", \"KPSAT\", \"PSAFREE\", \"PSAFREEPCT\", \"PSASCREEN\", \"PSACT\", \"PSAAGTOTAL\"    Activities of Daily Living:   Is your overall impression that this patient is benefiting (symptom reduction outweighs side effects) from testosterone therapy? Yes     1. Physical Functioning: Better  2. Family Relationship: Better  3. Social Relationship: Better  4. Mood: Better  5. Sleep Patterns: Better  6. Overall Function: Better    "

## 2023-11-16 NOTE — PROGRESS NOTES
Consults    Reason For Consult  panhypopituitarism    History Of Present Illness  Bryan Myers is a 53 y.o. male presenting with hypopituitarism     Complicated patient   Seen by me for 12 years   He has hypopituitarism  And difficulty absoption due to his bariatric surgery state     Hypothyroidism , hypogonadism and secondary adrenal insufficiency   300 mcg levothyroxine , no recent labs   Testosterone 200 mg every 2 weeks , no labs noted   Prednisone 5 mg daily       He is going to have colonoscopy having red blood per rectum, for few weeks   Seen podiatry for ulcer in the stump 5/22/2023     Any family history of thyroid cancer? None   Any personal history of radiation to your head/neck? 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    Surgical History  He has a past surgical history that includes Other surgical history (05/25/2021).     Social History  He has no history on file for tobacco use, alcohol use, and drug use.    Family History  No family history on file.     Allergies  Bee venom protein (honey bee), Adhesive tape-silicones, and Adhesive    Review of Systems    Past Medical History:   Diagnosis Date    Acquired absence of right leg below knee (CMS/[facility]) 03/05/2021    Status post below knee amputation of right lower extremity    Hypopituitarism (CMS/[facility]) 05/25/2021    Central hypogonadism    Morbid (severe) obesity due to excess calories (CMS/[facility]) 03/05/2021    Obesity, Class III, BMI 40-49.9 (morbid obesity)    Nephrotic syndrome with focal and segmental glomerular lesions 03/05/2021    FSGS (focal segmental  "glomerulosclerosis) with nephrosis    Other adrenocortical insufficiency (CMS/[facility]) 05/25/2021    Secondary adrenal insufficiency    Vitamin D deficiency, unspecified 03/05/2021    Hypovitaminosis D       Past Surgical History:   Procedure Laterality Date    OTHER SURGICAL HISTORY  05/25/2021    Lower extremity amputation below knee       Social History     Socioeconomic History    Marital status:      Spouse name: Not on file    Number of children: Not on file    Years of education: Not on file    Highest education level: Not on file   Occupational History    Not on file   Tobacco Use    Smoking status: Not on file    Smokeless tobacco: Not on file   Substance and Sexual Activity    Alcohol use: Not on file    Drug use: Not on file    Sexual activity: Not on file   Other Topics Concern    Not on file   Social History Narrative    Not on file     Social Determinants of Health     Financial Resource Strain: Not on file   Food Insecurity: Not on file   Transportation Needs: Not on file   Physical Activity: Not on file   Stress: Not on file   Social Connections: Not on file   Intimate Partner Violence: Not on file   Housing Stability: Not on file        Physical Exam     ROS, PMH, FH/SH, surgical history and allergies have been reviewed.    Last Recorded Vitals  There were no vitals taken for this visit.    Relevant Results         If you would like to pull in Medications, type .meds     If you would like to pull in Lab results for the last 24 hours, type .[id number]    If you would like to pull in specific Lab results, type .ll     If you would like to pull in Imaging results, type .[id number] :99}  Lab Review  No results found for: \"BILITOT\", \"CALCIUM\", \"CO2\", \"CL\", \"CREATININE\", \"GLUCOSE\", \"ALKPHOS\", \"K\", \"PROT\", \"NA\", \"AST\", \"ALT\", \"BUN\", \"ANIONGAP\", \"MG\", \"PHOS\", \"GGT\", \"LDH\", \"ALBUMIN\", \"AMYLASE\", \"LIPASE\", \"GFRF\", \"GFRMALE\"  No results found for: \"TRIG\", \"CHOL\", \"LDLCALC\", \"HDL\"  No results found for: " "\"HGBA1C\"  The ASCVD Risk score (Martin FLANAGAN, et al., 2019) failed to calculate for the following reasons:    The systolic blood pressure is missing    Cannot find a previous HDL lab    Cannot find a previous total cholesterol lab    The smoking status is invalid       Assessment/Plan   Problem List Items Addressed This Visit    None  Visit Diagnoses         Codes    Hypogonadism male    -  Primary E29.1    Relevant Medications    predniSONE (Deltasone) 5 mg tablet    Other Relevant Orders    Thyroxine, Free    Testosterone,Free and Total    Hypothyroidism, unspecified type     E03.9    Relevant Medications    testosterone cypionate (Depo-Testosterone) 200 mg/mL injection    predniSONE (Deltasone) 5 mg tablet    Other Relevant Orders    Thyroxine, Free    Testosterone,Free and Total    Central hypogonadism (CMS/HCC)     E23.0    Relevant Medications    levothyroxine (Synthroid, Levoxyl) 100 mcg tablet    predniSONE (Deltasone) 5 mg tablet    Other Relevant Orders    Prostate Specific Antigen, Screen            Due testosterone levels   OARRS reviewed  Sent the contact   Continue levothyroxine 300 mcg a day   Has Ig A nephropathy CRF   Central hypothyroidism , free T4        I spent a total of 40+ minutes on the date of the service which included preparing to see the patient, face-to-face patient care, completing clinical documentation, obtaining and / or reviewing separately obtained history, counseling and educating the patient/family/caregiver, ordering medications, tests, or procedures, communicating with other healthcare providers (not separately reported), independently interpreting results, not separately reported, and communicating results to the patient/family/caregiver, real-time telemedicine visit using audiovisual technology with patient's verbal consent.  Name and date of birth verified.      Esvin Bills MD    "

## 2023-12-21 ENCOUNTER — LAB (OUTPATIENT)
Dept: LAB | Facility: LAB | Age: 53
End: 2023-12-21
Payer: COMMERCIAL

## 2023-12-21 DIAGNOSIS — E23.0 CENTRAL HYPOGONADISM (MULTI): ICD-10-CM

## 2023-12-21 DIAGNOSIS — E29.1 HYPOGONADISM MALE: ICD-10-CM

## 2023-12-21 DIAGNOSIS — E03.9 HYPOTHYROIDISM, UNSPECIFIED TYPE: ICD-10-CM

## 2023-12-21 LAB
PSA SERPL-MCNC: 1.08 NG/ML
T4 FREE SERPL-MCNC: 0.43 NG/DL (ref 0.78–1.48)

## 2023-12-21 PROCEDURE — 84439 ASSAY OF FREE THYROXINE: CPT

## 2023-12-21 PROCEDURE — 84153 ASSAY OF PSA TOTAL: CPT

## 2023-12-21 PROCEDURE — 84402 ASSAY OF FREE TESTOSTERONE: CPT

## 2023-12-21 PROCEDURE — 36415 COLL VENOUS BLD VENIPUNCTURE: CPT

## 2023-12-29 LAB
TESTOSTERONE FREE (CHAN): 18.4 PG/ML (ref 35–155)
TESTOSTERONE,TOTAL,LC-MS/MS: 108 NG/DL (ref 250–1100)

## 2024-03-28 DIAGNOSIS — E23.0 CENTRAL HYPOGONADISM (MULTI): Primary | ICD-10-CM

## 2024-03-28 DIAGNOSIS — E29.1 HYPOGONADISM MALE: ICD-10-CM

## 2024-03-28 DIAGNOSIS — E23.0 CENTRAL HYPOGONADISM (MULTI): ICD-10-CM

## 2024-03-28 DIAGNOSIS — E03.9 HYPOTHYROIDISM, UNSPECIFIED TYPE: ICD-10-CM

## 2024-03-28 DIAGNOSIS — E29.1 HYPOGONADISM MALE: Primary | ICD-10-CM

## 2024-04-01 ENCOUNTER — TELEPHONE (OUTPATIENT)
Dept: ENDOCRINOLOGY | Facility: CLINIC | Age: 54
End: 2024-04-01
Payer: COMMERCIAL

## 2024-04-01 NOTE — TELEPHONE ENCOUNTER
Prior Authorization Approval  for testosterone received effective 03/02/2024-04/01/2025. Marisa Miller LPN

## 2024-04-01 NOTE — TELEPHONE ENCOUNTER
Additional information sent to [facility] concerning patients testosterone as requested to fax number [phone]

## 2024-05-17 ENCOUNTER — HOSPITAL ENCOUNTER (EMERGENCY)
Age: 54
Discharge: HOME | End: 2024-05-17
Payer: COMMERCIAL

## 2024-05-17 VITALS
OXYGEN SATURATION: 98 % | WEIGHT: 300 LBS | DIASTOLIC BLOOD PRESSURE: 73 MMHG | RESPIRATION RATE: 20 BRPM | TEMPERATURE: 98.6 F | SYSTOLIC BLOOD PRESSURE: 126 MMHG | HEART RATE: 74 BPM | BODY MASS INDEX: 44.3 KG/M2
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
## 2024-05-17 PROCEDURE — 36415 COLL VENOUS BLD VENIPUNCTURE: CPT

## 2024-05-17 PROCEDURE — 86618 LYME DISEASE ANTIBODY: CPT

## 2024-05-17 PROCEDURE — 99211 OFF/OP EST MAY X REQ PHY/QHP: CPT

## 2024-05-17 RX ORDER — DOXYCYCLINE HYCLATE 100 MG
100 TABLET ORAL 2 TIMES DAILY
Qty: 28 TABLET | Refills: 0 | Status: SHIPPED | OUTPATIENT
Start: 2024-05-17 | End: 2024-05-31

## 2024-05-17 NOTE — ED PROVIDER NOTES
Department of Emergency Medicine  [facility]  Provider Note  Admit Date/Time: 2024 11:42 AM  Room:   NAME: Ho Ingram  : 1970  MRN: [id number]     Chief Complaint:  Tick Removal (Pt stated that Monday night he noticed a tick on his beard/chin area. Now last night he is starting to have fever, lethargic )    History of Present Illness        Ho Ingram is a 54 y.o. male who has a past medical history of:   Past Medical History:   Diagnosis Date    Asthma     Broken finger     COPD (chronic obstructive pulmonary disease) ([facility])     Hx of BKA (HCC)     Hyperlipidemia     Hypertension     IgA nephropathy     Kidney damage     MRSA infection     Thyroid disease     presents to the urgent care center by private car for evaluation.  He removed a tick from his beard area about 5 days ago and he said said yesterday he started not feeling right he said he had a low-grade fever he feels achy and feels tired he is concerned he may have contracted Lyme disease he said he does not have any rashes anywhere.  Is not complaining of cough or nasal congestion.  ROS    Pertinent positives and negatives are stated within HPI, all other systems reviewed and are negative.    Past Surgical History:   Procedure Laterality Date    ABDOMEN SURGERY      BARIATRIC SURGERY      LEG AMPUTATION THROUGH LOWER TIBIA AND FIBULA     Social History:  reports that he quit smoking about 16 years ago. His smoking use included cigarettes. He has never used smokeless tobacco. He reports that he does not drink alcohol and does not use drugs.  Family History: family history includes Heart Disease in his father.  Allergies: Bee venom and Adhesive tape    Physical Exam   Oxygen Saturation Interpretation: Normal.   ED Triage Vitals [24 1143]   BP Temp Temp src Pulse Respirations SpO2 Height Weight - Scale   126/73 98.6 °F (37 °C) -- 74 20 98 % -- 136.1 kg (300 lb)       Physical

## 2024-05-20 LAB — B BURGDOR AB SER IA-ACNC: 0.26 IV

## 2024-06-21 ENCOUNTER — HOSPITAL ENCOUNTER (EMERGENCY)
Age: 54
Discharge: HOME | End: 2024-06-21
Payer: COMMERCIAL

## 2024-06-21 VITALS
WEIGHT: 290 LBS | DIASTOLIC BLOOD PRESSURE: 59 MMHG | TEMPERATURE: 98.4 F | RESPIRATION RATE: 20 BRPM | HEART RATE: 72 BPM | BODY MASS INDEX: 42.83 KG/M2 | OXYGEN SATURATION: 95 % | SYSTOLIC BLOOD PRESSURE: 122 MMHG

## 2024-06-21 DIAGNOSIS — N30.01 ACUTE CYSTITIS WITH HEMATURIA: Primary | ICD-10-CM

## 2024-06-21 LAB
BACTERIA URNS QL MICRO: ABNORMAL
BILIRUB UR QL STRIP: NEGATIVE
BUN BLD-MCNC: 17 MG/DL (ref 6–20)
CHLORIDE BLD-SCNC: 99 MMOL/L (ref 100–108)
CLARITY UR: CLEAR
CO2 BLD CALC-SCNC: 31 MMOL/L (ref 22–29)
COLOR UR: YELLOW
CREAT BLD-MCNC: 1.6 MG/DL (ref 0.7–1.2)
EGFR, POC: 51 ML/MIN/1.73M2
GLUCOSE BLD-MCNC: 95 MG/DL (ref 74–99)
GLUCOSE UR STRIP-MCNC: >=1000 MG/DL
HGB UR QL STRIP.AUTO: ABNORMAL
KETONES UR STRIP-MCNC: NEGATIVE MG/DL
LEUKOCYTE ESTERASE UR QL STRIP: NEGATIVE
NITRITE UR QL STRIP: NEGATIVE
PH UR STRIP: 5.5 [PH] (ref 5–9)
POC ANION GAP: 8 MMOL/L (ref 7–16)
POTASSIUM BLD-SCNC: 4.2 MMOL/L (ref 3.5–5)
PROT UR STRIP-MCNC: 100 MG/DL
RBC #/AREA URNS HPF: ABNORMAL /HPF
SODIUM BLD-SCNC: 138 MMOL/L (ref 132–146)
SP GR UR STRIP: >1.03 (ref 1–1.03)
UROBILINOGEN UR STRIP-ACNC: 0.2 EU/DL (ref 0–1)
WBC #/AREA URNS HPF: ABNORMAL /HPF

## 2024-06-21 PROCEDURE — 82947 ASSAY GLUCOSE BLOOD QUANT: CPT

## 2024-06-21 PROCEDURE — 82565 ASSAY OF CREATININE: CPT

## 2024-06-21 PROCEDURE — 99211 OFF/OP EST MAY X REQ PHY/QHP: CPT

## 2024-06-21 PROCEDURE — 87086 URINE CULTURE/COLONY COUNT: CPT

## 2024-06-21 PROCEDURE — 80051 ELECTROLYTE PANEL: CPT

## 2024-06-21 PROCEDURE — 84520 ASSAY OF UREA NITROGEN: CPT

## 2024-06-21 PROCEDURE — 81001 URINALYSIS AUTO W/SCOPE: CPT

## 2024-06-21 RX ORDER — PHENAZOPYRIDINE HYDROCHLORIDE 100 MG/1
100 TABLET, FILM COATED ORAL 3 TIMES DAILY PRN
Qty: 6 TABLET | Refills: 0 | Status: SHIPPED | OUTPATIENT
Start: 2024-06-21 | End: 2024-06-23

## 2024-06-21 RX ORDER — CEPHALEXIN 500 MG/1
500 CAPSULE ORAL 3 TIMES DAILY
Qty: 21 CAPSULE | Refills: 0 | Status: SHIPPED | OUTPATIENT
Start: 2024-06-21 | End: 2024-06-28

## 2024-06-21 ASSESSMENT — PAIN - FUNCTIONAL ASSESSMENT: PAIN_FUNCTIONAL_ASSESSMENT: NONE - DENIES PAIN

## 2024-06-21 NOTE — ED PROVIDER NOTES
[facility] URGENT [facility]ARE  EMERGENCY DEPARTMENT ENCOUNTER        NAME: Ho Ingram  :  1970  MRN:  [id number]  Date of evaluation: 2024  Provider: Man Mooney PA-C  PCP: Lopez Landeros DO  Note Started : 12:33 PM EDT 24    Chief Complaint: Urinary Tract Infection (Hx of kidney damage, almost failure, tends to get uti's )      This is a 54-year-old male that presents to urgent care with family.  He has been having some dysuria and some frequency and urgency with urination.  And he does complain of having a fever last night.  He does have a history of kidney insufficiency.  I first contact patient he appears to be in no acute distress.        Review of Systems  Pertinent positives and negatives are stated within HPI, all other systems reviewed and are negative.     Allergies: Bee venom and Adhesive tape     --------------------------------------------- PAST HISTORY ---------------------------------------------  Past Medical History:  has a past medical history of Asthma, Broken finger, COPD (chronic obstructive pulmonary disease) (HCC), Hx of BKA (HCC), Hyperlipidemia, Hypertension, IgA nephropathy, Kidney damage, MRSA infection, and Thyroid disease.    Past Surgical History:  has a past surgical history that includes Leg amputation, lower tibia/fibula; Abdomen surgery; and Bariatric Surgery.    Social History:  reports that he quit smoking about 16 years ago. His smoking use included cigarettes. He has never used smokeless tobacco. He reports that he does not drink alcohol and does not use drugs.    Family History: family history includes Heart Disease in his father.     The patient’s home medications have been reviewed.    The nursing notes within the ED encounter have been reviewed.     ------------------------------------------------SCREENINGS----------------------------------------------                        CIWA Assessment  BP: (!) 122/59  Pulse: 72            -------------------------------------------------PROCEDURES--------------------------------------------  Unless otherwise noted below, none      Procedures      ---EMERGENCY DEPARTMENT COURSE and DIFFERENTIAL DIAGNOSIS/MDM---  (CC/HPI Summary, DDx, ED Course, and Reassessment:) (Disposition Considerations (include 1 Tests not done, Admit vs D/C, Shared Decision Making, Pt Expectation of Test or Tx., Consults, Social Determinants, Chronic Conditiions, Records reviewed)    MDM  Number of Diagnoses or Management Options  Acute cystitis with hematuria  Diagnosis management comments: No acute distress having urinary symptoms.  No CVA tenderness afebrile here vitals are stable.  GFR is 51.  Placed on Keflex antibiotic for urinary tract infection.  Does have some WBCs bacteria microscopic red blood cells.  Take Tylenol for pain and fever.  Pyridium as directed for urinary symptoms.  Recommend close follow-up with primary care provider.  Instructions given.         DISCHARGE MEDICATIONS:  New Prescriptions    No medications on file       DISCONTINUED MEDICATIONS:  Discontinued Medications    No medications on file       PATIENT REFERRED TO:  Lopez Landeros,   [address] [place] [address] [id number] [phone]            --------------------------------- ADDITIONAL PROVIDER NOTES ---------------------------------  I have spoken with the patient and discussed today’s results, in addition to providing specific details for the plan of care and counseling regarding the diagnosis and prognosis.  Their questions are answered at this time and they are agreeable with the plan.   This patient is stable for discharge.  I have shared the specific conditions for return, as well as the importance of follow-up.      * NOTE: (Please note that portions of this note were completed with a voice recognition program.  Efforts were made to edit the dictations but occasionally words are mis-transcribed.)    ---------------------------------

## 2024-06-23 LAB
MICROORGANISM SPEC CULT: NO GROWTH
SPECIMEN DESCRIPTION: NORMAL

## 2024-09-17 ENCOUNTER — APPOINTMENT (OUTPATIENT)
Dept: ENDOCRINOLOGY | Facility: CLINIC | Age: 54
End: 2024-09-17
Payer: COMMERCIAL

## 2024-10-24 ENCOUNTER — APPOINTMENT (OUTPATIENT)
Dept: CT IMAGING | Age: 54
End: 2024-10-24
Payer: COMMERCIAL

## 2024-10-24 ENCOUNTER — HOSPITAL ENCOUNTER (EMERGENCY)
Age: 54
Discharge: HOME | End: 2024-10-25
Attending: STUDENT IN AN ORGANIZED HEALTH CARE EDUCATION/TRAINING PROGRAM
Payer: COMMERCIAL

## 2024-10-24 DIAGNOSIS — R40.4 TRANSIENT ALTERATION OF AWARENESS: Primary | ICD-10-CM

## 2024-10-24 LAB
ALBUMIN SERPL-MCNC: 4.3 G/DL (ref 3.5–5.2)
ALP SERPL-CCNC: 88 U/L (ref 40–129)
ALT SERPL-CCNC: 41 U/L (ref 0–40)
AMMONIA PLAS-SCNC: 27 UMOL/L (ref 16–60)
AMPHET UR QL SCN: NEGATIVE
ANION GAP SERPL CALCULATED.3IONS-SCNC: 13 MMOL/L (ref 7–16)
AST SERPL-CCNC: 45 U/L (ref 0–39)
BARBITURATES UR QL SCN: NEGATIVE
BASOPHILS # BLD: 0.15 K/UL (ref 0–0.2)
BASOPHILS NFR BLD: 1 % (ref 0–2)
BENZODIAZ UR QL: NEGATIVE
BILIRUB SERPL-MCNC: 0.6 MG/DL (ref 0–1.2)
BILIRUB UR QL STRIP: NEGATIVE
BUN SERPL-MCNC: 17 MG/DL (ref 6–20)
BUPRENORPHINE UR QL: NEGATIVE
CALCIUM SERPL-MCNC: 9.8 MG/DL (ref 8.6–10.2)
CANNABINOIDS UR QL SCN: POSITIVE
CHLORIDE SERPL-SCNC: 101 MMOL/L (ref 98–107)
CLARITY UR: CLEAR
CO2 SERPL-SCNC: 25 MMOL/L (ref 22–29)
COCAINE UR QL SCN: NEGATIVE
COLOR UR: YELLOW
CREAT SERPL-MCNC: 1.5 MG/DL (ref 0.7–1.2)
EKG ATRIAL RATE: 117 BPM
EKG P AXIS: 54 DEGREES
EKG P-R INTERVAL: 164 MS
EKG Q-T INTERVAL: 310 MS
EKG QRS DURATION: 84 MS
EKG QTC CALCULATION (BAZETT): 432 MS
EKG R AXIS: 51 DEGREES
EKG T AXIS: 51 DEGREES
EKG VENTRICULAR RATE: 117 BPM
EOSINOPHIL # BLD: 0 K/UL (ref 0.05–0.5)
EOSINOPHILS RELATIVE PERCENT: 0 % (ref 0–6)
EPI CELLS #/AREA URNS HPF: ABNORMAL /HPF
ERYTHROCYTE [DISTWIDTH] IN BLOOD BY AUTOMATED COUNT: 16.1 % (ref 11.5–15)
FENTANYL UR QL: NEGATIVE
FLUAV RNA RESP QL NAA+PROBE: NOT DETECTED
FLUBV RNA RESP QL NAA+PROBE: NOT DETECTED
GFR, ESTIMATED: 57 ML/MIN/1.73M2
GLUCOSE SERPL-MCNC: 96 MG/DL (ref 74–99)
GLUCOSE UR STRIP-MCNC: 500 MG/DL
HCT VFR BLD AUTO: 51.5 % (ref 37–54)
HGB BLD-MCNC: 17.7 G/DL (ref 12.5–16.5)
HGB UR QL STRIP.AUTO: ABNORMAL
KETONES UR STRIP-MCNC: NEGATIVE MG/DL
LACTATE BLDV-SCNC: 1.4 MMOL/L (ref 0.5–2.2)
LACTATE BLDV-SCNC: 2 MMOL/L (ref 0.5–1.9)
LEUKOCYTE ESTERASE UR QL STRIP: NEGATIVE
LYMPHOCYTES NFR BLD: 5.19 K/UL (ref 1.5–4)
LYMPHOCYTES RELATIVE PERCENT: 30 % (ref 20–42)
MAGNESIUM SERPL-MCNC: 2.4 MG/DL (ref 1.6–2.6)
MCH RBC QN AUTO: 29.9 PG (ref 26–35)
MCHC RBC AUTO-ENTMCNC: 34.4 G/DL (ref 32–34.5)
MCV RBC AUTO: 87 FL (ref 80–99.9)
METHADONE UR QL: NEGATIVE
MONOCYTES NFR BLD: 1.04 K/UL (ref 0.1–0.95)
MONOCYTES NFR BLD: 6 % (ref 2–12)
NEUTROPHILS NFR BLD: 64 % (ref 43–80)
NEUTS SEG NFR BLD: 11.12 K/UL (ref 1.8–7.3)
NITRITE UR QL STRIP: NEGATIVE
OPIATES UR QL SCN: NEGATIVE
OXYCODONE UR QL SCN: NEGATIVE
PCP UR QL SCN: NEGATIVE
PH UR STRIP: 6 [PH] (ref 5–9)
PLATELET # BLD AUTO: 386 K/UL (ref 130–450)
PMV BLD AUTO: 10 FL (ref 7–12)
POTASSIUM SERPL-SCNC: 4 MMOL/L (ref 3.5–5)
PROT SERPL-MCNC: 8.6 G/DL (ref 6.4–8.3)
PROT UR STRIP-MCNC: 30 MG/DL
RBC # BLD AUTO: 5.92 M/UL (ref 3.8–5.8)
RBC # BLD: ABNORMAL 10*6/UL
RBC #/AREA URNS HPF: ABNORMAL /HPF
SARS-COV-2 RNA RESP QL NAA+PROBE: NOT DETECTED
SODIUM SERPL-SCNC: 139 MMOL/L (ref 132–146)
SOURCE: NORMAL
SP GR UR STRIP: 1.01 (ref 1–1.03)
SPECIMEN DESCRIPTION: NORMAL
TEST INFORMATION: ABNORMAL
TROPONIN I SERPL HS-MCNC: 48 NG/L (ref 0–11)
TROPONIN I SERPL HS-MCNC: 51 NG/L (ref 0–11)
UROBILINOGEN UR STRIP-ACNC: 0.2 EU/DL (ref 0–1)
WBC #/AREA URNS HPF: ABNORMAL /HPF
WBC OTHER # BLD: 17.5 K/UL (ref 4.5–11.5)

## 2024-10-24 PROCEDURE — 96374 THER/PROPH/DIAG INJ IV PUSH: CPT

## 2024-10-24 PROCEDURE — 74178 CT ABD&PLV WO CNTR FLWD CNTR: CPT

## 2024-10-24 PROCEDURE — 87040 BLOOD CULTURE FOR BACTERIA: CPT

## 2024-10-24 PROCEDURE — 6360000004 HC RX CONTRAST MEDICATION: Performed by: RADIOLOGY

## 2024-10-24 PROCEDURE — 93010 ELECTROCARDIOGRAM REPORT: CPT | Performed by: INTERNAL MEDICINE

## 2024-10-24 PROCEDURE — 93005 ELECTROCARDIOGRAM TRACING: CPT | Performed by: STUDENT IN AN ORGANIZED HEALTH CARE EDUCATION/TRAINING PROGRAM

## 2024-10-24 PROCEDURE — 6360000002 HC RX W HCPCS

## 2024-10-24 PROCEDURE — 84443 ASSAY THYROID STIM HORMONE: CPT

## 2024-10-24 PROCEDURE — 83605 ASSAY OF LACTIC ACID: CPT

## 2024-10-24 PROCEDURE — 2500000003 HC RX 250 WO HCPCS: Performed by: STUDENT IN AN ORGANIZED HEALTH CARE EDUCATION/TRAINING PROGRAM

## 2024-10-24 PROCEDURE — 82140 ASSAY OF AMMONIA: CPT

## 2024-10-24 PROCEDURE — 6370000000 HC RX 637 (ALT 250 FOR IP): Performed by: STUDENT IN AN ORGANIZED HEALTH CARE EDUCATION/TRAINING PROGRAM

## 2024-10-24 PROCEDURE — 84484 ASSAY OF TROPONIN QUANT: CPT

## 2024-10-24 PROCEDURE — 84439 ASSAY OF FREE THYROXINE: CPT

## 2024-10-24 PROCEDURE — 71275 CT ANGIOGRAPHY CHEST: CPT

## 2024-10-24 PROCEDURE — 80179 DRUG ASSAY SALICYLATE: CPT

## 2024-10-24 PROCEDURE — 83735 ASSAY OF MAGNESIUM: CPT

## 2024-10-24 PROCEDURE — 81001 URINALYSIS AUTO W/SCOPE: CPT

## 2024-10-24 PROCEDURE — 85025 COMPLETE CBC W/AUTO DIFF WBC: CPT

## 2024-10-24 PROCEDURE — 80143 DRUG ASSAY ACETAMINOPHEN: CPT

## 2024-10-24 PROCEDURE — 87636 SARSCOV2 & INF A&B AMP PRB: CPT

## 2024-10-24 PROCEDURE — 0202U NFCT DS 22 TRGT SARS-COV-2: CPT

## 2024-10-24 PROCEDURE — 96365 THER/PROPH/DIAG IV INF INIT: CPT

## 2024-10-24 PROCEDURE — 96372 THER/PROPH/DIAG INJ SC/IM: CPT

## 2024-10-24 PROCEDURE — 70450 CT HEAD/BRAIN W/O DYE: CPT

## 2024-10-24 PROCEDURE — 87086 URINE CULTURE/COLONY COUNT: CPT

## 2024-10-24 PROCEDURE — G0480 DRUG TEST DEF 1-7 CLASSES: HCPCS

## 2024-10-24 PROCEDURE — 99285 EMERGENCY DEPT VISIT HI MDM: CPT

## 2024-10-24 PROCEDURE — 72125 CT NECK SPINE W/O DYE: CPT

## 2024-10-24 PROCEDURE — 80307 DRUG TEST PRSMV CHEM ANLYZR: CPT

## 2024-10-24 PROCEDURE — 2580000003 HC RX 258: Performed by: STUDENT IN AN ORGANIZED HEALTH CARE EDUCATION/TRAINING PROGRAM

## 2024-10-24 PROCEDURE — 80053 COMPREHEN METABOLIC PANEL: CPT

## 2024-10-24 PROCEDURE — 96375 TX/PRO/DX INJ NEW DRUG ADDON: CPT

## 2024-10-24 PROCEDURE — 6360000002 HC RX W HCPCS: Performed by: STUDENT IN AN ORGANIZED HEALTH CARE EDUCATION/TRAINING PROGRAM

## 2024-10-24 RX ORDER — PREDNISONE 5 MG/1
5 TABLET ORAL ONCE
Status: COMPLETED | OUTPATIENT
Start: 2024-10-24 | End: 2024-10-24

## 2024-10-24 RX ORDER — IOPAMIDOL 755 MG/ML
75 INJECTION, SOLUTION INTRAVASCULAR
Status: COMPLETED | OUTPATIENT
Start: 2024-10-24 | End: 2024-10-24

## 2024-10-24 RX ORDER — 0.9 % SODIUM CHLORIDE 0.9 %
1000 INTRAVENOUS SOLUTION INTRAVENOUS ONCE
Status: COMPLETED | OUTPATIENT
Start: 2024-10-24 | End: 2024-10-24

## 2024-10-24 RX ORDER — MORPHINE SULFATE 4 MG/ML
4 INJECTION, SOLUTION INTRAMUSCULAR; INTRAVENOUS ONCE
Status: COMPLETED | OUTPATIENT
Start: 2024-10-25 | End: 2024-10-24

## 2024-10-24 RX ORDER — HALOPERIDOL 5 MG/ML
5 INJECTION INTRAMUSCULAR ONCE
Status: COMPLETED | OUTPATIENT
Start: 2024-10-24 | End: 2024-10-24

## 2024-10-24 RX ORDER — DEXMEDETOMIDINE HYDROCHLORIDE 4 UG/ML
.1-1.5 INJECTION, SOLUTION INTRAVENOUS CONTINUOUS
Status: DISCONTINUED | OUTPATIENT
Start: 2024-10-24 | End: 2024-10-25

## 2024-10-24 RX ORDER — ZIPRASIDONE MESYLATE 20 MG/ML
20 INJECTION, POWDER, LYOPHILIZED, FOR SOLUTION INTRAMUSCULAR ONCE
Status: COMPLETED | OUTPATIENT
Start: 2024-10-24 | End: 2024-10-24

## 2024-10-24 RX ORDER — LORAZEPAM 2 MG/ML
2 INJECTION INTRAMUSCULAR ONCE
Status: COMPLETED | OUTPATIENT
Start: 2024-10-24 | End: 2024-10-24

## 2024-10-24 RX ADMIN — HYDROMORPHONE HYDROCHLORIDE 0.5 MG: 1 INJECTION, SOLUTION INTRAMUSCULAR; INTRAVENOUS; SUBCUTANEOUS at 20:01

## 2024-10-24 RX ADMIN — LORAZEPAM 2 MG: 2 INJECTION INTRAMUSCULAR; INTRAVENOUS at 18:24

## 2024-10-24 RX ADMIN — PREDNISONE 5 MG: 5 TABLET ORAL at 22:44

## 2024-10-24 RX ADMIN — HALOPERIDOL LACTATE 5 MG: 5 INJECTION, SOLUTION INTRAMUSCULAR at 19:17

## 2024-10-24 RX ADMIN — SODIUM CHLORIDE 1000 ML: 9 INJECTION, SOLUTION INTRAVENOUS at 21:42

## 2024-10-24 RX ADMIN — IOPAMIDOL 75 ML: 755 INJECTION, SOLUTION INTRAVENOUS at 22:21

## 2024-10-24 RX ADMIN — DEXMEDETOMIDINE HYDROCHLORIDE 0.2 MCG/KG/HR: 400 INJECTION, SOLUTION INTRAVENOUS at 20:37

## 2024-10-24 RX ADMIN — SODIUM CHLORIDE 1000 ML: 9 INJECTION, SOLUTION INTRAVENOUS at 20:36

## 2024-10-24 RX ADMIN — MORPHINE SULFATE 4 MG: 4 INJECTION, SOLUTION INTRAMUSCULAR; INTRAVENOUS at 23:59

## 2024-10-24 RX ADMIN — ZIPRASIDONE MESYLATE 20 MG: 20 INJECTION, POWDER, LYOPHILIZED, FOR SOLUTION INTRAMUSCULAR at 19:38

## 2024-10-24 ASSESSMENT — LIFESTYLE VARIABLES
HOW OFTEN DO YOU HAVE A DRINK CONTAINING ALCOHOL: NEVER
HOW MANY STANDARD DRINKS CONTAINING ALCOHOL DO YOU HAVE ON A TYPICAL DAY: PATIENT DOES NOT DRINK

## 2024-10-24 ASSESSMENT — PAIN SCALES - GENERAL
PAINLEVEL_OUTOF10: 8
PAINLEVEL_OUTOF10: 9
PAINLEVEL_OUTOF10: 8

## 2024-10-24 ASSESSMENT — PAIN DESCRIPTION - ORIENTATION
ORIENTATION: RIGHT
ORIENTATION: RIGHT;LOWER
ORIENTATION: RIGHT

## 2024-10-24 ASSESSMENT — PAIN - FUNCTIONAL ASSESSMENT: PAIN_FUNCTIONAL_ASSESSMENT: 0-10

## 2024-10-24 ASSESSMENT — PAIN DESCRIPTION - DESCRIPTORS
DESCRIPTORS: BURNING;STABBING
DESCRIPTORS: DISCOMFORT
DESCRIPTORS: DISCOMFORT

## 2024-10-24 ASSESSMENT — PAIN DESCRIPTION - LOCATION
LOCATION: LEG

## 2024-10-24 NOTE — ED PROVIDER NOTES
[facility] EMERGENCY DEPARTMENT  EMERGENCY DEPARTMENT ENCOUNTER        Pt Name: oH Ingram  MRN: [id number]  Birthdate 1970  Date of evaluation: 10/24/2024  Provider: Tonia Hughes DO  PCP: Lopez Landeros DO  Note Started: 5:28 PM EDT 10/24/24    CHIEF COMPLAINT       Chief Complaint   Patient presents with    Altered Mental Status     MANY C/O PER FAMILY   New pain pump [facility] on Tuesday, hx MI 10/3/24 and c/o cp today at home, denies pain now, aspirin with ems; very restless, AMS per family since last night with fever and chills and increased urination   Pt states he fell backward today in chair and hit head on vacuum, abrasion to forehead, denies other injury        HISTORY OF PRESENT ILLNESS: 1 or more Elements   History From: Patient    Limitations to history : Altered Mental Status  Social Determinants : None    Ho Ingram is a 54 y.o. male who presents for altered mental status.  Per family, patient was acting different than his normal self today.  The wife states that the patient had a new pain pump inserted at [facility] on Tuesday.  She is concerned that the pain pump may not be working.  He has been complaining of pain in his partial amputated right lower extremity.  Patient states that he did fall today.  He fell backwards in a chair and hit his head on the vacuum.  He denies any loss conscious.  He is not on any blood thinners.  He has not had any drainage from his incision site.  He was complaining of chest pain earlier today.  It was not worse with exertion.  Aspirin was given by EMS.  Denies any fever, chills, n/v, headache, dizziness, vision changes, neck tenderness or stiffness, weakness, palpitations, leg swelling/tenderness, sob, cough, abd pain, dysuria, hematuria, diarrhea, constipation, bloody stools.    Nursing Notes were all reviewed and agreed with or any disagreements were addressed in the HPI.    ROS:   Pertinent

## 2024-10-24 NOTE — ED NOTES
Pt states he had some whiskey today because pain was so bad, family states morphine in pain pump, provider made aware.

## 2024-10-24 NOTE — ED NOTES
Pt becoming increasingly agitated and is exhibiting unsafe behavior trying to climb over side rails and climb down bottom of bed, provider made aware.     Pt also has noted change in mentation, unable to tell birthday.

## 2024-10-24 NOTE — ED NOTES
This RN attempted venipuncture for labs, pt restless and moving around in bed, both attempts for venipuncture failed. Unable to obtain EKG for the same. Dr. Constantino and Dr. Mendoza aware.

## 2024-10-25 VITALS
TEMPERATURE: 98.7 F | DIASTOLIC BLOOD PRESSURE: 97 MMHG | SYSTOLIC BLOOD PRESSURE: 146 MMHG | HEART RATE: 82 BPM | RESPIRATION RATE: 23 BRPM | WEIGHT: 275 LBS | BODY MASS INDEX: 40.73 KG/M2 | HEIGHT: 69 IN | OXYGEN SATURATION: 92 %

## 2024-10-25 LAB
APAP SERPL-MCNC: <5 UG/ML (ref 10–30)
B PARAP IS1001 DNA NPH QL NAA+NON-PROBE: NOT DETECTED
B PERT DNA SPEC QL NAA+PROBE: NOT DETECTED
BASOPHILS # BLD: 0 K/UL (ref 0–0.2)
BASOPHILS NFR BLD: 0 % (ref 0–2)
C PNEUM DNA NPH QL NAA+NON-PROBE: NOT DETECTED
EOSINOPHIL # BLD: 0 K/UL (ref 0.05–0.5)
EOSINOPHILS RELATIVE PERCENT: 0 % (ref 0–6)
ERYTHROCYTE [DISTWIDTH] IN BLOOD BY AUTOMATED COUNT: 16.2 % (ref 11.5–15)
ETHANOLAMINE SERPL-MCNC: 32 MG/DL (ref 0–0.08)
FLUAV RNA NPH QL NAA+NON-PROBE: NOT DETECTED
FLUBV RNA NPH QL NAA+NON-PROBE: NOT DETECTED
HADV DNA NPH QL NAA+NON-PROBE: NOT DETECTED
HCOV 229E RNA NPH QL NAA+NON-PROBE: NOT DETECTED
HCOV HKU1 RNA NPH QL NAA+NON-PROBE: NOT DETECTED
HCOV NL63 RNA NPH QL NAA+NON-PROBE: NOT DETECTED
HCOV OC43 RNA NPH QL NAA+NON-PROBE: NOT DETECTED
HCT VFR BLD AUTO: 47.3 % (ref 37–54)
HGB BLD-MCNC: 15.9 G/DL (ref 12.5–16.5)
HMPV RNA NPH QL NAA+NON-PROBE: NOT DETECTED
HPIV1 RNA NPH QL NAA+NON-PROBE: NOT DETECTED
HPIV2 RNA NPH QL NAA+NON-PROBE: NOT DETECTED
HPIV3 RNA NPH QL NAA+NON-PROBE: NOT DETECTED
HPIV4 RNA NPH QL NAA+NON-PROBE: NOT DETECTED
LYMPHOCYTES NFR BLD: 2.39 K/UL (ref 1.5–4)
LYMPHOCYTES RELATIVE PERCENT: 16 % (ref 20–42)
M PNEUMO DNA NPH QL NAA+NON-PROBE: NOT DETECTED
MCH RBC QN AUTO: 29.6 PG (ref 26–35)
MCHC RBC AUTO-ENTMCNC: 33.6 G/DL (ref 32–34.5)
MCV RBC AUTO: 87.9 FL (ref 80–99.9)
MICROORGANISM SPEC CULT: NO GROWTH
MONOCYTES NFR BLD: 1.06 K/UL (ref 0.1–0.95)
MONOCYTES NFR BLD: 7 % (ref 2–12)
MYELOCYTES ABSOLUTE COUNT: 0.13 K/UL
MYELOCYTES: 1 %
NEUTROPHILS NFR BLD: 76 % (ref 43–80)
NEUTS SEG NFR BLD: 11.42 K/UL (ref 1.8–7.3)
NUCLEATED RED BLOOD CELLS: 3 PER 100 WBC
PLATELET # BLD AUTO: 256 K/UL (ref 130–450)
PMV BLD AUTO: 9.5 FL (ref 7–12)
RBC # BLD AUTO: 5.38 M/UL (ref 3.8–5.8)
RBC # BLD: ABNORMAL 10*6/UL
RSV RNA NPH QL NAA+NON-PROBE: NOT DETECTED
RV+EV RNA NPH QL NAA+NON-PROBE: NOT DETECTED
SALICYLATES SERPL-MCNC: 1 MG/DL (ref 0–30)
SARS-COV-2 RNA NPH QL NAA+NON-PROBE: NOT DETECTED
SERVICE CMNT-IMP: NORMAL
SPECIMEN DESCRIPTION: NORMAL
SPECIMEN DESCRIPTION: NORMAL
T4 FREE SERPL-MCNC: 1.5 NG/DL (ref 0.9–1.7)
TOXIC TRICYCLIC SC,BLOOD: NEGATIVE
TSH SERPL DL<=0.05 MIU/L-ACNC: 3.92 UIU/ML (ref 0.27–4.2)
WBC OTHER # BLD: 15 K/UL (ref 4.5–11.5)

## 2024-10-25 PROCEDURE — 85025 COMPLETE CBC W/AUTO DIFF WBC: CPT

## 2024-10-25 RX ORDER — MORPHINE SULFATE 2 MG/ML
2 INJECTION, SOLUTION INTRAMUSCULAR; INTRAVENOUS
Status: DISCONTINUED | OUTPATIENT
Start: 2024-10-25 | End: 2024-10-25 | Stop reason: HOSPADM

## 2024-10-25 NOTE — PROGRESS NOTES
This RN supervisor called by ED staff due to the patient's wife becoming belligerent with nurses and physician. When asked about the dispute, the wife states she is angry that the patient is not being immediately transferred. EMTALA regulations described to the spouse including the necessity of a medical screening exam and stabilization prior to transfer. Also educated on the need for an accepting physician and bed placement prior to the patient leaving. Wife continues to be argumentative, although verbalizes understanding of the process. Wife also states the patient is going to \"withdrawal\". Educated on the patient receiving medications to avoid this side effect of the pain pump malfunction. Patient being monitored and provided with medication at length. Again, verbalizes understanding but continues poor behavior.

## 2024-10-25 NOTE — DISCHARGE INSTRUCTIONS
It is possible that your pain pump is malfunctioning and you are experiencing symptoms of withdrawal.  Recommend prompt follow up with pain management.

## 2024-10-25 NOTE — ED NOTES
Safety sitter in room called for charge nurse to come to room. She explained to this nurse that patients wife was taking pictures of her and sending them to other people. This nurse explained to the wife that this is illegal behavior and she will be made to delete the photos. [place] police arrived at bedside and escorted family out of the ER. Safety sitter remains at bedside at this time.

## 2024-10-25 NOTE — ED NOTES
Patient resting in bed. Eyes closed and appears to be sleeping. Respirations even. Sitter at bedside

## 2024-10-27 LAB
MICROORGANISM SPEC CULT: NORMAL
MICROORGANISM SPEC CULT: NORMAL
SERVICE CMNT-IMP: NORMAL
SERVICE CMNT-IMP: NORMAL
SPECIMEN DESCRIPTION: NORMAL
SPECIMEN DESCRIPTION: NORMAL

## 2024-10-29 LAB
MICROORGANISM SPEC CULT: NORMAL
SERVICE CMNT-IMP: NORMAL
SPECIMEN DESCRIPTION: NORMAL

## 2024-10-30 LAB
MICROORGANISM SPEC CULT: NORMAL
SERVICE CMNT-IMP: NORMAL
SPECIMEN DESCRIPTION: NORMAL

## 2024-11-07 DIAGNOSIS — E23.0 CENTRAL HYPOGONADISM (MULTI): ICD-10-CM

## 2024-11-07 RX ORDER — LEVOTHYROXINE SODIUM 100 UG/1
TABLET ORAL
Qty: 270 TABLET | Refills: 3 | Status: SHIPPED | OUTPATIENT
Start: 2024-11-07

## 2024-11-12 ENCOUNTER — HOSPITAL ENCOUNTER (EMERGENCY)
Age: 54
Discharge: HOME | End: 2024-11-12
Payer: COMMERCIAL

## 2024-11-12 VITALS
HEART RATE: 82 BPM | SYSTOLIC BLOOD PRESSURE: 162 MMHG | DIASTOLIC BLOOD PRESSURE: 86 MMHG | RESPIRATION RATE: 18 BRPM | OXYGEN SATURATION: 100 % | BODY MASS INDEX: 40.61 KG/M2 | WEIGHT: 275 LBS | TEMPERATURE: 97.3 F

## 2024-11-12 DIAGNOSIS — R31.9 HEMATURIA, UNSPECIFIED TYPE: ICD-10-CM

## 2024-11-12 DIAGNOSIS — R30.0 DYSURIA: Primary | ICD-10-CM

## 2024-11-12 LAB
BILIRUB UR QL STRIP: NEGATIVE
CLARITY UR: CLEAR
COLOR UR: YELLOW
GLUCOSE UR STRIP-MCNC: NEGATIVE MG/DL
HGB UR QL STRIP.AUTO: ABNORMAL
KETONES UR STRIP-MCNC: NEGATIVE MG/DL
LEUKOCYTE ESTERASE UR QL STRIP: NEGATIVE
NITRITE UR QL STRIP: NEGATIVE
PH UR STRIP: 5.5 [PH] (ref 5–9)
PROT UR STRIP-MCNC: NEGATIVE MG/DL
RBC #/AREA URNS HPF: ABNORMAL /HPF
SP GR UR STRIP: 1.01 (ref 1–1.03)
UROBILINOGEN UR STRIP-ACNC: 0.2 EU/DL (ref 0–1)
WBC #/AREA URNS HPF: ABNORMAL /HPF

## 2024-11-12 PROCEDURE — 87086 URINE CULTURE/COLONY COUNT: CPT

## 2024-11-12 PROCEDURE — 99211 OFF/OP EST MAY X REQ PHY/QHP: CPT

## 2024-11-12 PROCEDURE — 81001 URINALYSIS AUTO W/SCOPE: CPT

## 2024-11-12 RX ORDER — PHENAZOPYRIDINE HYDROCHLORIDE 100 MG/1
100 TABLET, FILM COATED ORAL 3 TIMES DAILY PRN
Qty: 9 TABLET | Refills: 0 | Status: SHIPPED | OUTPATIENT
Start: 2024-11-12 | End: 2024-11-15

## 2024-11-12 RX ORDER — NITROFURANTOIN 25; 75 MG/1; MG/1
100 CAPSULE ORAL 2 TIMES DAILY
Qty: 10 CAPSULE | Refills: 0 | Status: SHIPPED | OUTPATIENT
Start: 2024-11-12 | End: 2024-11-17

## 2024-11-12 ASSESSMENT — PAIN SCALES - GENERAL: PAINLEVEL_OUTOF10: 0

## 2024-11-12 ASSESSMENT — PAIN - FUNCTIONAL ASSESSMENT: PAIN_FUNCTIONAL_ASSESSMENT: 0-10

## 2024-11-12 NOTE — ED PROVIDER NOTES
Independent GAIL Visit.    HPI:  11/12/24,   Time: 8:23 AM ED Ingram is a 54 y.o. male presenting to the ED for possible urinary tract infection.  Symptoms started yesterday.  He has had UTIs in the past as well as kidney stones and LIAM.  He does have a nephrologist he follows up with including clinic.  His symptoms started yesterday.  Sudden onset of dysuria, urgency and frequency.  Decree stream.  Color change in the urine he reports is darker than normal.  Has not tried any medications for his symptoms.  Nothing makes it better or worse.  Denies concerns for STDs.  Denies fever, chills, body aches, abdominal pain, nausea, vomiting, diarrhea or flank pain.  ROS:   Pertinent positives and negatives are stated within HPI, all other systems reviewed and are negative.  --------------------------------------------- PAST HISTORY ---------------------------------------------  Past Medical History:  has a past medical history of Asthma, Broken finger, COPD (chronic obstructive pulmonary disease) ([facility]), Hx of BKA ([facility]), Hyperlipidemia, Hypertension, IgA nephropathy, Kidney damage, MRSA infection, and Thyroid disease.    Past Surgical History:  has a past surgical history that includes Leg amputation, lower tibia/fibula; Abdomen surgery; and Bariatric Surgery.    Social History:  reports that he quit smoking about 17 years ago. His smoking use included cigarettes. He has never used smokeless tobacco. He reports that he does not drink alcohol and does not use drugs.    Family History: family history includes Heart Disease in his father.     The patient’s home medications have been reviewed.    Allergies: Bee venom and Adhesive tape    -------------------------------------------------- RESULTS -------------------------------------------------  All laboratory and radiology results have been personally reviewed by myself   LABS:  Results for orders placed or performed during the hospital encounter of 11/12/24

## 2024-11-13 LAB
MICROORGANISM SPEC CULT: NO GROWTH
SERVICE CMNT-IMP: NORMAL
SPECIMEN DESCRIPTION: NORMAL

## 2025-04-27 ENCOUNTER — APPOINTMENT (OUTPATIENT)
Dept: RADIOLOGY | Facility: HOSPITAL | Age: 55
End: 2025-04-27
Payer: COMMERCIAL

## 2025-04-27 ENCOUNTER — APPOINTMENT (OUTPATIENT)
Dept: CARDIOLOGY | Facility: HOSPITAL | Age: 55
End: 2025-04-27
Payer: COMMERCIAL

## 2025-04-27 ENCOUNTER — HOSPITAL ENCOUNTER (EMERGENCY)
Facility: HOSPITAL | Age: 55
Discharge: HOME | End: 2025-04-27
Attending: EMERGENCY MEDICINE
Payer: COMMERCIAL

## 2025-04-27 VITALS
WEIGHT: 260 LBS | BODY MASS INDEX: 37.22 KG/M2 | HEART RATE: 76 BPM | RESPIRATION RATE: 16 BRPM | OXYGEN SATURATION: 95 % | SYSTOLIC BLOOD PRESSURE: 150 MMHG | DIASTOLIC BLOOD PRESSURE: 87 MMHG | HEIGHT: 70 IN | TEMPERATURE: 97.2 F

## 2025-04-27 DIAGNOSIS — S13.4XXA SPRAIN OF LIGAMENTS OF CERVICAL SPINE, INITIAL ENCOUNTER: ICD-10-CM

## 2025-04-27 DIAGNOSIS — R10.9 ABDOMINAL PAIN, UNSPECIFIED ABDOMINAL LOCATION: ICD-10-CM

## 2025-04-27 DIAGNOSIS — S53.402A SPRAIN OF LEFT UPPER ARM, INITIAL ENCOUNTER: ICD-10-CM
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
## 2025-04-27 DIAGNOSIS — R51.9 NONINTRACTABLE HEADACHE, UNSPECIFIED CHRONICITY PATTERN, UNSPECIFIED HEADACHE TYPE: ICD-10-CM

## 2025-04-27 DIAGNOSIS — R91.1 PULMONARY NODULE: ICD-10-CM

## 2025-04-27 DIAGNOSIS — R07.89 CHEST WALL PAIN: ICD-10-CM

## 2025-04-27 LAB
ALBUMIN SERPL BCP-MCNC: 3.7 G/DL (ref 3.4–5)
ALP SERPL-CCNC: 83 U/L (ref 33–120)
ALT SERPL W P-5'-P-CCNC: 30 U/L (ref 10–52)
ANION GAP SERPL CALC-SCNC: 10 MMOL/L (ref 10–20)
APTT PPP: 32 SECONDS (ref 26–36)
AST SERPL W P-5'-P-CCNC: 32 U/L (ref 9–39)
BASOPHILS # BLD AUTO: 0.07 X10*3/UL (ref 0–0.1)
BASOPHILS NFR BLD AUTO: 0.8 %
BILIRUB SERPL-MCNC: 0.4 MG/DL (ref 0–1.2)
BUN SERPL-MCNC: 20 MG/DL (ref 6–23)
CALCIUM SERPL-MCNC: 8.6 MG/DL (ref 8.6–10.3)
CARDIAC TROPONIN I PNL SERPL HS: 8 NG/L (ref 0–20)
CHLORIDE SERPL-SCNC: 101 MMOL/L (ref 98–107)
CO2 SERPL-SCNC: 30 MMOL/L (ref 21–32)
CREAT SERPL-MCNC: 1.46 MG/DL (ref 0.5–1.3)
EGFRCR SERPLBLD CKD-EPI 2021: 57 ML/MIN/1.73M*2
EOSINOPHIL # BLD AUTO: 0.21 X10*3/UL (ref 0–0.7)
EOSINOPHIL NFR BLD AUTO: 2.4 %
ERYTHROCYTE [DISTWIDTH] IN BLOOD BY AUTOMATED COUNT: 13.2 % (ref 11.5–14.5)
GLUCOSE SERPL-MCNC: 110 MG/DL (ref 74–99)
HCT VFR BLD AUTO: 46.7 % (ref 41–52)
HGB BLD-MCNC: 15.7 G/DL (ref 13.5–17.5)
IMM GRANULOCYTES # BLD AUTO: 0.04 X10*3/UL (ref 0–0.7)
IMM GRANULOCYTES NFR BLD AUTO: 0.5 % (ref 0–0.9)
INR PPP: 1 (ref 0.9–1.1)
LACTATE SERPL-SCNC: 1.6 MMOL/L (ref 0.4–2)
LYMPHOCYTES # BLD AUTO: 3.07 X10*3/UL (ref 1.2–4.8)
LYMPHOCYTES NFR BLD AUTO: 35.4 %
MAGNESIUM SERPL-MCNC: 1.96 MG/DL (ref 1.6–2.4)
MCH RBC QN AUTO: 30.3 PG (ref 26–34)
MCHC RBC AUTO-ENTMCNC: 33.6 G/DL (ref 32–36)
MCV RBC AUTO: 90 FL (ref 80–100)
MONOCYTES # BLD AUTO: 0.64 X10*3/UL (ref 0.1–1)
MONOCYTES NFR BLD AUTO: 7.4 %
NEUTROPHILS # BLD AUTO: 4.65 X10*3/UL (ref 1.2–7.7)
NEUTROPHILS NFR BLD AUTO: 53.5 %
NRBC BLD-RTO: 0 /100 WBCS (ref 0–0)
PLATELET # BLD AUTO: 308 X10*3/UL (ref 150–450)
POTASSIUM SERPL-SCNC: 3.4 MMOL/L (ref 3.5–5.3)
PROT SERPL-MCNC: 6.8 G/DL (ref 6.4–8.2)
PROTHROMBIN TIME: 11 SECONDS (ref 9.8–12.4)
RBC # BLD AUTO: 5.18 X10*6/UL (ref 4.5–5.9)
SODIUM SERPL-SCNC: 138 MMOL/L (ref 136–145)
WBC # BLD AUTO: 8.7 X10*3/UL (ref 4.4–11.3)

## 2025-04-27 PROCEDURE — 85025 COMPLETE CBC W/AUTO DIFF WBC: CPT | Performed by: NURSE PRACTITIONER

## 2025-04-27 PROCEDURE — 71260 CT THORAX DX C+: CPT | Performed by: RADIOLOGY

## 2025-04-27 PROCEDURE — 70450 CT HEAD/BRAIN W/O DYE: CPT

## 2025-04-27 PROCEDURE — 70450 CT HEAD/BRAIN W/O DYE: CPT | Performed by: RADIOLOGY

## 2025-04-27 PROCEDURE — 73090 X-RAY EXAM OF FOREARM: CPT | Mod: LT

## 2025-04-27 PROCEDURE — 83605 ASSAY OF LACTIC ACID: CPT | Performed by: NURSE PRACTITIONER

## 2025-04-27 PROCEDURE — 2500000004 HC RX 250 GENERAL PHARMACY W/ HCPCS (ALT 636 FOR OP/ED): Performed by: NURSE PRACTITIONER

## 2025-04-27 PROCEDURE — 74177 CT ABD & PELVIS W/CONTRAST: CPT

## 2025-04-27 PROCEDURE — 85730 THROMBOPLASTIN TIME PARTIAL: CPT | Performed by: NURSE PRACTITIONER

## 2025-04-27 PROCEDURE — 85610 PROTHROMBIN TIME: CPT | Performed by: NURSE PRACTITIONER

## 2025-04-27 PROCEDURE — 93005 ELECTROCARDIOGRAM TRACING: CPT

## 2025-04-27 PROCEDURE — 96361 HYDRATE IV INFUSION ADD-ON: CPT

## 2025-04-27 PROCEDURE — 84484 ASSAY OF TROPONIN QUANT: CPT | Performed by: NURSE PRACTITIONER

## 2025-04-27 PROCEDURE — 73090 X-RAY EXAM OF FOREARM: CPT | Mod: LEFT SIDE | Performed by: RADIOLOGY

## 2025-04-27 PROCEDURE — 96374 THER/PROPH/DIAG INJ IV PUSH: CPT

## 2025-04-27 PROCEDURE — 73060 X-RAY EXAM OF HUMERUS: CPT | Mod: LT

## 2025-04-27 PROCEDURE — 74177 CT ABD & PELVIS W/CONTRAST: CPT | Performed by: RADIOLOGY

## 2025-04-27 PROCEDURE — 96375 TX/PRO/DX INJ NEW DRUG ADDON: CPT

## 2025-04-27 PROCEDURE — 96376 TX/PRO/DX INJ SAME DRUG ADON: CPT

## 2025-04-27 PROCEDURE — 72125 CT NECK SPINE W/O DYE: CPT

## 2025-04-27 PROCEDURE — 99285 EMERGENCY DEPT VISIT HI MDM: CPT | Mod: 25 | Performed by: EMERGENCY MEDICINE

## 2025-04-27 PROCEDURE — 83735 ASSAY OF MAGNESIUM: CPT | Performed by: NURSE PRACTITIONER

## 2025-04-27 PROCEDURE — 80053 COMPREHEN METABOLIC PANEL: CPT | Performed by: NURSE PRACTITIONER

## 2025-04-27 PROCEDURE — 73080 X-RAY EXAM OF ELBOW: CPT | Mod: LT

## 2025-04-27 PROCEDURE — 72125 CT NECK SPINE W/O DYE: CPT | Performed by: RADIOLOGY

## 2025-04-27 PROCEDURE — 73080 X-RAY EXAM OF ELBOW: CPT | Mod: LEFT SIDE | Performed by: RADIOLOGY

## 2025-04-27 PROCEDURE — 73060 X-RAY EXAM OF HUMERUS: CPT | Mod: LEFT SIDE | Performed by: RADIOLOGY

## 2025-04-27 PROCEDURE — 36415 COLL VENOUS BLD VENIPUNCTURE: CPT | Performed by: NURSE PRACTITIONER

## 2025-04-27 PROCEDURE — 2550000001 HC RX 255 CONTRASTS: Performed by: NURSE PRACTITIONER

## 2025-04-27 RX ORDER — OXYCODONE HYDROCHLORIDE 5 MG/1
5 TABLET ORAL 2 TIMES DAILY PRN
Qty: 6 TABLET | Refills: 0 | Status: SHIPPED | OUTPATIENT
Start: 2025-04-27 | End: 2025-04-30

## 2025-04-27 RX ORDER — MORPHINE SULFATE 4 MG/ML
4 INJECTION INTRAVENOUS ONCE
Status: COMPLETED | OUTPATIENT
Start: 2025-04-27 | End: 2025-04-27

## 2025-04-27 RX ORDER — KETOROLAC TROMETHAMINE 15 MG/ML
15 INJECTION, SOLUTION INTRAMUSCULAR; INTRAVENOUS ONCE
Status: COMPLETED | OUTPATIENT
Start: 2025-04-27 | End: 2025-04-27

## 2025-04-27 RX ORDER — ONDANSETRON HYDROCHLORIDE 2 MG/ML
4 INJECTION, SOLUTION INTRAVENOUS ONCE
Status: COMPLETED | OUTPATIENT
Start: 2025-04-27 | End: 2025-04-27

## 2025-04-27 RX ORDER — TIZANIDINE 4 MG/1
4 TABLET ORAL EVERY 6 HOURS PRN
Qty: 30 TABLET | Refills: 0 | Status: SHIPPED | OUTPATIENT
Start: 2025-04-27 | End: 2025-05-07

## 2025-04-27 RX ADMIN — MORPHINE SULFATE 4 MG: 4 INJECTION INTRAVENOUS at 16:12

## 2025-04-27 RX ADMIN — MORPHINE SULFATE 4 MG: 4 INJECTION INTRAVENOUS at 17:16

## 2025-04-27 RX ADMIN — ONDANSETRON 4 MG: 2 INJECTION, SOLUTION INTRAMUSCULAR; INTRAVENOUS at 16:12

## 2025-04-27 RX ADMIN — SODIUM CHLORIDE 500 ML: 0.9 INJECTION, SOLUTION INTRAVENOUS at 16:12

## 2025-04-27 RX ADMIN — MORPHINE SULFATE 4 MG: 4 INJECTION INTRAVENOUS at 18:37

## 2025-04-27 RX ADMIN — IOHEXOL 75 ML: 350 INJECTION, SOLUTION INTRAVENOUS at 17:28

## 2025-04-27 RX ADMIN — KETOROLAC TROMETHAMINE 15 MG: 15 INJECTION, SOLUTION INTRAMUSCULAR; INTRAVENOUS at 16:13

## 2025-04-27 ASSESSMENT — PAIN SCALES - GENERAL
PAINLEVEL_OUTOF10: 6
PAINLEVEL_OUTOF10: 4
PAINLEVEL_OUTOF10: 7
PAINLEVEL_OUTOF10: 3

## 2025-04-27 ASSESSMENT — COLUMBIA-SUICIDE SEVERITY RATING SCALE - C-SSRS
1. IN THE PAST MONTH, HAVE YOU WISHED YOU WERE DEAD OR WISHED YOU COULD GO TO SLEEP AND NOT WAKE UP?: NO
2. HAVE YOU ACTUALLY HAD ANY THOUGHTS OF KILLING YOURSELF?: NO
6. HAVE YOU EVER DONE ANYTHING, STARTED TO DO ANYTHING, OR PREPARED TO DO ANYTHING TO END YOUR LIFE?: NO

## 2025-04-27 ASSESSMENT — PAIN - FUNCTIONAL ASSESSMENT
PAIN_FUNCTIONAL_ASSESSMENT: 0-10

## 2025-04-27 ASSESSMENT — LIFESTYLE VARIABLES
HAVE YOU EVER FELT YOU SHOULD CUT DOWN ON YOUR DRINKING: NO
EVER HAD A DRINK FIRST THING IN THE MORNING TO STEADY YOUR NERVES TO GET RID OF A HANGOVER: NO
TOTAL SCORE: 0
EVER FELT BAD OR GUILTY ABOUT YOUR DRINKING: NO
HAVE PEOPLE ANNOYED YOU BY CRITICIZING YOUR DRINKING: NO

## 2025-04-27 ASSESSMENT — PAIN DESCRIPTION - LOCATION
LOCATION: ARM

## 2025-04-27 ASSESSMENT — PAIN DESCRIPTION - PAIN TYPE
TYPE: ACUTE PAIN
TYPE: ACUTE PAIN

## 2025-04-27 ASSESSMENT — PAIN DESCRIPTION - PROGRESSION: CLINICAL_PROGRESSION: GRADUALLY IMPROVING

## 2025-04-27 ASSESSMENT — PAIN DESCRIPTION - ORIENTATION
ORIENTATION: LEFT

## 2025-04-27 ASSESSMENT — PAIN DESCRIPTION - DESCRIPTORS: DESCRIPTORS: ACHING

## 2025-04-27 ASSESSMENT — PAIN DESCRIPTION - ONSET: ONSET: SUDDEN

## 2025-04-27 ASSESSMENT — PAIN DESCRIPTION - FREQUENCY: FREQUENCY: CONSTANT/CONTINUOUS
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
## 2025-04-27 NOTE — DISCHARGE INSTRUCTIONS
FINDINGS:  CHEST:      Aorta is normal in caliber. Mildly enlarged main pulmonary artery  measuring 3.1 cm which can be associated with pulmonary hypertension.  There is no evidence of abnormal mediastinal or hilar lymph node  enlargement.  The heart size is within normal limits.  No pericardial  effusion appreciated.      The lungs are clear without evidence of focal lung consolidation,  pulmonary mass, or pneumothorax.  There is no pleural effusion or  abnormal pleural thickening.  The central airway is unremarkable.      2 mm left upper lobe nodule on image 88.  2 mm left upper lobe nodule on image 93.  3 mm left upper lobe nodule on image 116.  1-2 mm left lower lobe nodule on image 154.  2 mm right middle lobe nodule on image 193.          Visualized osseous structures are normal for age.          ABDOMEN:  Gallbladder is surgically absent.  Postsurgical changes of stomach noted.  The liver, spleen, pancreas, and adrenal glands are within normal  limits.      7 mm nonobstructing nephrolith in the interpolar region of the right  kidney with an additional nearby smaller nephrolith. The kidneys are  otherwise unremarkable. No hydronephrosis.      The bowel is normal caliber, without evidence of obstruction, focal  bowel wall thickening, or inflammatory process. The appendix appears  normal.      No mesenteric or retroperitoneal lymphadenopathy. No ascites.  Vascular structures are unremarkable. Mild soft tissue contusion is  noted in the anterior aspect of the subcutaneous lower abdomen with  small soft tissue density present.          PELVIS:  Urinary bladder is unremarkable.  Prostate gland is normal in size.  No pelvic lymphadenopathy.      Bones: No acute osseous abnormality.  Electronic device device battery packs are visualized projecting in  the left gluteal region as well as in the right anterior abdominal  wall.      IMPRESSION:  CHEST:  1. No findings of traumatic injury to thorax.  2. Multiple pulmonary  nodules as described above measuring up to 3  mm. Incidental Finding:  A non-calcified pulmonary nodule / multiple  non-calcified pulmonary nodules measuring less than 6 mm, likely  benign.      Instructions:  No further follow-up is required, however, if the  patient has high risk factors for primary lung malignancy, follow-up  noncontrast CT scan chest in 12 months may be obtained. (Mo Garcia et al., Guidelines for management of incidental pulmonary  nodules detected on CT images: From the Fleischner Society 2017,  Radiology. 2017 Jul;284 (1):228-243.) [email or web address]  3. Mildly enlarged main pulmonary artery which can be associated with  pulmonary hypertension. Correlate with history      ABDOMEN-PELVIS:  1. Small focal subcutaneous soft tissue contusion of the anterior  lower abdomen. Otherwise, no findings of traumatic injury to  abdominal or pelvis.  2. 7 mm nonobstructing nephrolith in the interpolar region of the  right kidney.    Ct scan head  IMPRESSION:  1. No evidence of acute cortical infarct or intracranial hemorrhage.  2. No evidence of intracranial hemorrhage or displaced skull fracture.  3. Bilateral maxillary sinus mucosal thickening and dependent region  fluid compatible with sinusitis.        Ct scan neck  There is reversal of normal cervical lordosis which can be associated  with patient positioning or muscle spasm. There is approximately 3 mm  grade 1 C2-3 and 2 mm grade 1 C3-4 and trace grade 1 C4-5  anterolisthesis. There is 2-3 mm grade 1 retrolisthesis at C5-6.  There is trace retrolisthesis at C6-7. Severe left C3-4 and moderate  right C3-4 bony foraminal stenosis due to uncovertebral and facet  degenerative changes. Severe left and moderate right C4-5 foraminal  stenosis. Severe bilateral C5-6 foraminal stenosis. Severe bilateral  C6-7 foraminal stenosis.      Mild C3-4 spinal canal stenosis. Mild C4-5 spinal canal stenosis.  Moderate to severe C5-6 spinal canal stenosis.  Moderate to severe  C6-7 spinal canal stenosis.      Evaluation of ligamentous laxity or injury as well as spinal canal  contents is limited on CT study.      IMPRESSION:  No evidence for an acute fracture or subluxation of the cervical  spine.      Advanced multilevel cervical spine degenerative changes as described  above with multilevel degenerative listhesis.    XR elbow left 3+ views Final result 4/27/2025         1. No acute fracture or malalignment of the left elbow.  2.  Small corticated ossific density at the tip of the coronoid  process likely representing sequela of remote avulsion injury.  3. Productive osseous changes along the medial/lateral humeral  condyle suggesting sequela of chronic epicondylitis.     KEEP ACTIVE  WEAR THE SPLINT  SEE PCP THIS WEEK  FOLLOWUP WITH ORTHO FOR LEFT FOREARM PAIN

## 2025-05-02 LAB
ATRIAL RATE: 87 BPM
P AXIS: 41 DEGREES
P OFFSET: 193 MS
P ONSET: 131 MS
PR INTERVAL: 174 MS
Q ONSET: 218 MS
QRS COUNT: 14 BEATS
QRS DURATION: 98 MS
QT INTERVAL: 384 MS
QTC CALCULATION(BAZETT): 462 MS
QTC FREDERICIA: 434 MS
R AXIS: -23 DEGREES
T AXIS: 50 DEGREES
T OFFSET: 410 MS
VENTRICULAR RATE: 87 BPM

## 2025-06-03 ENCOUNTER — APPOINTMENT (OUTPATIENT)
Dept: RADIOLOGY | Facility: HOSPITAL | Age: 55
End: 2025-06-03
Payer: COMMERCIAL

## 2025-06-03 ENCOUNTER — HOSPITAL ENCOUNTER (EMERGENCY)
Facility: HOSPITAL | Age: 55
Discharge: HOME | End: 2025-06-03
Payer: COMMERCIAL

## 2025-06-03 VITALS
HEIGHT: 70 IN | RESPIRATION RATE: 18 BRPM | SYSTOLIC BLOOD PRESSURE: 155 MMHG | OXYGEN SATURATION: 99 % | BODY MASS INDEX: 38.02 KG/M2 | WEIGHT: 265.54 LBS | TEMPERATURE: 99.1 F | HEART RATE: 78 BPM | DIASTOLIC BLOOD PRESSURE: 103 MMHG

## 2025-06-03 DIAGNOSIS — N20.0 CALCULUS OF RENAL PELVIS: Primary | ICD-10-CM

## 2025-06-03 DIAGNOSIS — R10.9 FLANK PAIN: ICD-10-CM

## 2025-06-03 LAB
ALBUMIN SERPL BCP-MCNC: 4.4 G/DL (ref 3.4–5)
ALP SERPL-CCNC: 81 U/L (ref 33–120)
ALT SERPL W P-5'-P-CCNC: 35 U/L (ref 10–52)
ANION GAP SERPL CALC-SCNC: 12 MMOL/L (ref 10–20)
APPEARANCE UR: CLEAR
AST SERPL W P-5'-P-CCNC: 35 U/L (ref 9–39)
BASOPHILS # BLD AUTO: 0.1 X10*3/UL (ref 0–0.1)
BASOPHILS NFR BLD AUTO: 0.9 %
BILIRUB DIRECT SERPL-MCNC: 0 MG/DL (ref 0–0.3)
BILIRUB SERPL-MCNC: 0.6 MG/DL (ref 0–1.2)
BILIRUB UR STRIP.AUTO-MCNC: NEGATIVE MG/DL
BUN SERPL-MCNC: 15 MG/DL (ref 6–23)
CALCIUM SERPL-MCNC: 9.3 MG/DL (ref 8.6–10.3)
CHLORIDE SERPL-SCNC: 102 MMOL/L (ref 98–107)
CO2 SERPL-SCNC: 27 MMOL/L (ref 21–32)
COLOR UR: ABNORMAL
CREAT SERPL-MCNC: 1.43 MG/DL (ref 0.5–1.3)
EGFRCR SERPLBLD CKD-EPI 2021: 58 ML/MIN/1.73M*2
EOSINOPHIL # BLD AUTO: 0.07 X10*3/UL (ref 0–0.7)
EOSINOPHIL NFR BLD AUTO: 0.6 %
ERYTHROCYTE [DISTWIDTH] IN BLOOD BY AUTOMATED COUNT: 13.4 % (ref 11.5–14.5)
GLUCOSE SERPL-MCNC: 93 MG/DL (ref 74–99)
GLUCOSE UR STRIP.AUTO-MCNC: ABNORMAL MG/DL
HCT VFR BLD AUTO: 51 % (ref 41–52)
HGB BLD-MCNC: 16.9 G/DL (ref 13.5–17.5)
IMM GRANULOCYTES # BLD AUTO: 0.06 X10*3/UL (ref 0–0.7)
IMM GRANULOCYTES NFR BLD AUTO: 0.5 % (ref 0–0.9)
KETONES UR STRIP.AUTO-MCNC: NEGATIVE MG/DL
LEUKOCYTE ESTERASE UR QL STRIP.AUTO: NEGATIVE
LIPASE SERPL-CCNC: 39 U/L (ref 9–82)
LYMPHOCYTES # BLD AUTO: 2.15 X10*3/UL (ref 1.2–4.8)
LYMPHOCYTES NFR BLD AUTO: 18.5 %
MCH RBC QN AUTO: 29.1 PG (ref 26–34)
MCHC RBC AUTO-ENTMCNC: 33.1 G/DL (ref 32–36)
MCV RBC AUTO: 88 FL (ref 80–100)
MONOCYTES # BLD AUTO: 0.9 X10*3/UL (ref 0.1–1)
MONOCYTES NFR BLD AUTO: 7.7 %
NEUTROPHILS # BLD AUTO: 8.37 X10*3/UL (ref 1.2–7.7)
NEUTROPHILS NFR BLD AUTO: 71.8 %
NITRITE UR QL STRIP.AUTO: NEGATIVE
NRBC BLD-RTO: 0 /100 WBCS (ref 0–0)
PH UR STRIP.AUTO: 6 [PH]
PLATELET # BLD AUTO: 390 X10*3/UL (ref 150–450)
POTASSIUM SERPL-SCNC: 3.9 MMOL/L (ref 3.5–5.3)
PROT SERPL-MCNC: 8.1 G/DL (ref 6.4–8.2)
PROT UR STRIP.AUTO-MCNC: ABNORMAL MG/DL
RBC # BLD AUTO: 5.81 X10*6/UL (ref 4.5–5.9)
RBC # UR STRIP.AUTO: NEGATIVE MG/DL
RBC #/AREA URNS AUTO: NORMAL /HPF
SODIUM SERPL-SCNC: 137 MMOL/L (ref 136–145)
SP GR UR STRIP.AUTO: 1.01
UROBILINOGEN UR STRIP.AUTO-MCNC: NORMAL MG/DL
WBC # BLD AUTO: 11.7 X10*3/UL (ref 4.4–11.3)
WBC #/AREA URNS AUTO: NORMAL /HPF

## 2025-06-03 PROCEDURE — 96375 TX/PRO/DX INJ NEW DRUG ADDON: CPT

## 2025-06-03 PROCEDURE — 96374 THER/PROPH/DIAG INJ IV PUSH: CPT

## 2025-06-03 PROCEDURE — 81001 URINALYSIS AUTO W/SCOPE: CPT

## 2025-06-03 PROCEDURE — 36415 COLL VENOUS BLD VENIPUNCTURE: CPT

## 2025-06-03 PROCEDURE — 74176 CT ABD & PELVIS W/O CONTRAST: CPT

## 2025-06-03 PROCEDURE — 80048 BASIC METABOLIC PNL TOTAL CA: CPT

## 2025-06-03 PROCEDURE — 83690 ASSAY OF LIPASE: CPT

## 2025-06-03 PROCEDURE — 85025 COMPLETE CBC W/AUTO DIFF WBC: CPT

## 2025-06-03 PROCEDURE — 74176 CT ABD & PELVIS W/O CONTRAST: CPT | Performed by: STUDENT IN AN ORGANIZED HEALTH CARE EDUCATION/TRAINING PROGRAM

## 2025-06-03 PROCEDURE — 2500000004 HC RX 250 GENERAL PHARMACY W/ HCPCS (ALT 636 FOR OP/ED)

## 2025-06-03 PROCEDURE — 99284 EMERGENCY DEPT VISIT MOD MDM: CPT | Mod: 25

## 2025-06-03 RX ORDER — MORPHINE SULFATE 4 MG/ML
4 INJECTION, SOLUTION INTRAMUSCULAR; INTRAVENOUS ONCE
Status: COMPLETED | OUTPATIENT
Start: 2025-06-03 | End: 2025-06-03

## 2025-06-03 RX ORDER — ONDANSETRON HYDROCHLORIDE 2 MG/ML
4 INJECTION, SOLUTION INTRAVENOUS ONCE
Status: COMPLETED | OUTPATIENT
Start: 2025-06-03 | End: 2025-06-03

## 2025-06-03 RX ORDER — DIAZEPAM 10 MG/1
10 TABLET ORAL EVERY 12 HOURS PRN
Qty: 6 TABLET | Refills: 0 | Status: SHIPPED | OUTPATIENT
Start: 2025-06-03

## 2025-06-03 RX ADMIN — ONDANSETRON 4 MG: 2 INJECTION, SOLUTION INTRAMUSCULAR; INTRAVENOUS at 17:09

## 2025-06-03 RX ADMIN — MORPHINE SULFATE 4 MG: 4 INJECTION, SOLUTION INTRAMUSCULAR; INTRAVENOUS at 17:09

## 2025-06-03 ASSESSMENT — PAIN DESCRIPTION - PAIN TYPE
TYPE: ACUTE PAIN
TYPE: ACUTE PAIN

## 2025-06-03 ASSESSMENT — PAIN SCALES - GENERAL
PAINLEVEL_OUTOF10: 6
PAINLEVEL_OUTOF10: 8

## 2025-06-03 ASSESSMENT — PAIN - FUNCTIONAL ASSESSMENT
PAIN_FUNCTIONAL_ASSESSMENT: 0-10
PAIN_FUNCTIONAL_ASSESSMENT: 0-10

## 2025-06-03 ASSESSMENT — PAIN DESCRIPTION - DESCRIPTORS: DESCRIPTORS: ACHING

## 2025-06-03 ASSESSMENT — PAIN DESCRIPTION - ORIENTATION
ORIENTATION: RIGHT
ORIENTATION: RIGHT

## 2025-06-03 ASSESSMENT — PAIN DESCRIPTION - PROGRESSION: CLINICAL_PROGRESSION: GRADUALLY IMPROVING

## 2025-06-03 NOTE — ED TRIAGE NOTES
Flank pain pain noted, reports history of kidney stones, some burning with urination but reports frequency, pain 6/10. Follows with Urology but pain is not decreasing with medical marijuanna (prescribed). Denies nausea or vomiting.

## 2025-06-03 NOTE — PROGRESS NOTES
Subjective   Patient ID: Bryan Myers is a 55 y.o. male    HPI  55 y.o. male who presents to establish for kidney stones. Recent CT scan revealed a 7 mm nonobstructing nephrolith in the interpolar region of the right kidney. He reports  right flank pain. He notes 2 kidney ablations on 11/2024 and 12/2024 noting that they were able to get 98 percent of the stone. He reports some gross hematuria. He does reports sciatica pain.      The most recent PSA, conducted on 02/21/2025, revealed:  2.22 ng/mL    The most recent CT chest abdomen pelvis w IV contrast, conducted on 06/03/2025, revealed:  There has been migration of a 0.8 cm stone into the right renal  pelvis. There remains no hydronephrosis. There also a nonobstructing  0.5 cm calculus in the upper pole calyx of the right kidney. No left  renal calculi or hydronephrosis.      Colonic diverticulosis.      Review of Systems    All systems were reviewed. Anything negative was noted in the HPI.    Objective   Physical Exam    General: Well developed, well nourished, alert and cooperative, appears in no acute distress   Eyes: Non-injected conjunctiva, sclera clear, no proptosis   Cardiac: Extremities are warm and well perfused. No edema, cyanosis or pallor   Lungs: Breathing is easy, non-labored. Speaking in clear and complete sentences. Normal diaphragmatic movement   MSK: Ambulatory with steady gait, unassisted   Neuro: Alert and oriented to person, place, and time   Psych: Demonstrates good judgment and reason, without hallucinations, abnormal affect or abnormal behaviors   Skin: No obvious lesions, no rashes       No CVA tenderness bilaterally   No suprapubic pain or discomfort       Medical History[1]      Surgical History[2]      Assessment/Plan   Kidney Stone    55 y.o. male who presents for the above condition, We had a very long and extensive discussion with the patient regarding his condition.  I discussed with the patient the pathophysiology,  differential diagnosis, risk factor, management of ureteral stones.  Explained to the patient that the stone is most probably still present given their persistent pain and the recent CT. I gave the patient 3 options of management including observation which I discouraged given their episode of fever, the size of the location of the stone.  Explained that they have less likely chance of spontaneous passage of the stone. We also discussed ESWL which would not be appropriate. We discussed at length a right ureteroscopy, laser stone fragmentation, right retrograde pyelogram, right double-J stent insertion. We discussed in detail the risk, benefit, potential complication, adverse events including hematuria, pneumaturia, pain, stent discomfort and pain, fever, chills, infection, urosepsis, I explained to the patient that most likely they need a second procedure at the first wound will be probably only a stent placement. I explained that the second procedure would be the actual laser stone fragmentation and exchange of their stent. Patient presents and elect to proceed.      Plan:  - Referred to Dr. Gonzalez    E&M visit today is associated with current or anticipated ongoing medical care services related to a patient's single, serious condition or a complex condition.     6/4/2025    Scribe Attestation  By signing my name below, I, Lizabeth Pathak attest that this documentation has been prepared under the direction and in the presence of Dr. Mynor Martinez.         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

## 2025-06-03 NOTE — ED PROVIDER NOTES
Chief Complaint   Patient presents with    Flank Pain       55-year-old male arrives to the emergency department with chief complaint of right flank pain.  The patient has an extensive and recent history of multiple obstructing kidney stones and requiring 2 lithotripsies at the end of 2024.  The patient states that last night he began having right flank pain that got up to a 10 out of 10, currently endorsing an 8 out of 10.  The patient does have multiple chronic pains, a BKA on the right after a traumatic injury.  Patient is hemodynamically stable upon arrival, slightly hypertensive with a history of hypertension however also considered a secondary effect of the patient's pain.  The patient has an intrathecal pump for chronic right nerve pain after the amputation as well as a nerve stimulator in place.  Patient is alert and orient x 4 and answers questions appropriately.  Denies any precipitating injury, does not have significant CVA tenderness on exam.  Denies any other abdominal pain      History provided by:  Patient   used: No         PmHx, PsHx, Allergies, Family Hx, social Hx reviewed as documented    A complete 10 point review of systems was performed and is negative except for as mentioned in the HPI.    Physical Exam:    General: Patient is in obvious discomfort, AAOx3, appears well developed, well nourished, is a good historian, answers questions appropriately    HEENT: head normocephalic, atraumatic, PERRLA, EOMs intact, oropharynx without erythema or exudate, buccal mucosa intact without lesions, TMs unremarkable, nose is patent bilateral    Neck: supple, full ROM, negative for lymphadenopathy, JVD, thyromegaly, tracheal deviation, nuccal rigidity    Pulmonary: CTAB, no accessory muscle use, able to speak full clear sentences    Cardiac: HRRR, no murmurs, rubs or gallops    GI: Intrathecal pump implanted right upper abdomen, endorses right flank pain worse with palpation though  denies CVA tenderness.  Otherwise abdomen soft and nontender, obese.    Musculoskeletal: Multiple chronic somatic complaints/nerve pains.  Otherwise patient full weight bearing, LANIER, no joint effusions, clubbing or edema noted    Skin: intact, no lesions or rashes noted, turgor is good.    Neuro: patient follow commands, cranial nerves 2-12 grossly intact, motor strengths 5/5 upper and lower extremities, DTR's and sensation are symmetrical. No focal deficits.    Rectal/: No urinary burning, urgency, change in frequency.  Patient has no rectal complaints        Medical Decision Making  This patient was seen in the emergency department with an attending physician available at all times throughout their ED course    Primary consideration for this patient given his extensive history would be a kidney stone on the right side, hydronephrosis, pyelonephritis, or other causative renal pathology.  Diagnostic blood work, CT abdomen pelvis without IV contrast will be used to further evaluate.  My first choice for pain medication would be Toradol, however the patient does have chronic kidney disease, patient will be given 4 mg of IV Zofran, 4 mg of IV morphine.    The patient's diagnostic blood work shows mild leukocytosis with a white blood cell count of 11.7, the patient's kidney function is consistent with his baseline kidney function.  The CT scan shows that there has been migration of a known 0.8 cm stone that has gone down into the right renal pelvis, there is no hydronephrosis appreciated, also a nonobstructing 0.5 cm in the upper pole, left kidney is benign and absence of stones.  The patient's urinalysis is negative for any infectious process    On reassessment the patient states that the pain medicine has helped with the pain, patient is concerned that he is not going to be able to sleep secondary to the discomfort though pain-free currently.  Although the stone in the right renal pelvis is likely a causative factor  "to the patient's pain, my concern is that there is surrounding musculoskeletal pain as well, patient given a short dose of Valium until such time he can follow with his urologist for definitive care and pain management if they deem necessary.    Patient is amenable to the plan of discharge as outlined above, all patient's questions pertaining to their ED course were answered in their entirety.  Strict return precautions were discussed with the patient and they verbalized understanding.  Further, it was made clear to the patient that from an emergent basis, all effort and testing was done to eliminate any imminent dangerous or potentially dangerous conditions of the patient however if their symptoms get much worse or feel life-threatening, they are to return to the emergency department or call 911 immediately.    Amount and/or Complexity of Data Reviewed  Labs: ordered. Decision-making details documented in ED Course.  Radiology: ordered. Decision-making details documented in ED Course.       Diagnoses as of 06/03/25 1920   Calculus of renal pelvis   Flank pain       The patient has had the following imaging during this ER visit: CT ABDOMEN PELVIS WO IV CONTRAST     Patient History   Medical History[1]  Surgical History[2]  Family History[3]  Social History[4]    ED Triage Vitals [06/03/25 1640]   Temperature Heart Rate Respirations BP   37.3 °C (99.1 °F) 96 20 (!) 157/112      Pulse Ox Temp Source Heart Rate Source Patient Position   96 % Oral Monitor Sitting      BP Location FiO2 (%)     Left arm --       Vitals:    06/03/25 1640 06/03/25 1904   BP: (!) 157/112 (!) 155/103   BP Location: Left arm Right arm   Patient Position: Sitting Sitting   Pulse: 96 78   Resp: 20 18   Temp: 37.3 °C (99.1 °F)    TempSrc: Oral    SpO2: 96% 99%   Weight: 120 kg (265 lb 8.7 oz)    Height: 1.778 m (5' 10\")                  [1]   Past Medical History:  Diagnosis Date    Acquired absence of right leg below knee (Multi) 03/05/2021    " Status post below knee amputation of right lower extremity    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    Implantable intrathecal infusion pump present     Morbid (severe) obesity due to excess calories (Multi) 03/05/2021    Obesity, Class III, BMI 40-49.9 (morbid obesity)    Nephrotic syndrome with focal and segmental glomerular lesions 03/05/2021    FSGS (focal segmental glomerulosclerosis) with nephrosis    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     [4]   Social History  Tobacco Use    Smoking status: Never    Smokeless tobacco: Never   Vaping Use    Vaping status: Never Used   Substance Use Topics    Alcohol use: Never    Drug use: Never        Manjeet Puri, JAC-CNP  06/03/25 1921

## 2025-06-04 ENCOUNTER — OFFICE VISIT (OUTPATIENT)
Dept: UROLOGY | Facility: HOSPITAL | Age: 55
End: 2025-06-04
Payer: COMMERCIAL

## 2025-06-04 ENCOUNTER — PREP FOR PROCEDURE (OUTPATIENT)
Dept: UROLOGY | Facility: HOSPITAL | Age: 55
End: 2025-06-04

## 2025-06-04 DIAGNOSIS — N20.0 KIDNEY STONE: ICD-10-CM

## 2025-06-04 DIAGNOSIS — T87.33: Primary | ICD-10-CM

## 2025-06-04 DIAGNOSIS — R39.9 LOWER URINARY TRACT SYMPTOMS (LUTS): ICD-10-CM

## 2025-06-04 DIAGNOSIS — N20.0 CALCULUS OF KIDNEY: Primary | ICD-10-CM

## 2025-06-04 LAB
POC APPEARANCE, URINE: CLEAR
POC BILIRUBIN, URINE: NEGATIVE
POC BLOOD, URINE: NEGATIVE
POC COLOR, URINE: YELLOW
POC GLUCOSE, URINE: ABNORMAL MG/DL
POC KETONES, URINE: NEGATIVE MG/DL
POC LEUKOCYTES, URINE: NEGATIVE
POC NITRITE,URINE: NEGATIVE
POC PH, URINE: 6 PH
POC PROTEIN, URINE: ABNORMAL MG/DL
POC SPECIFIC GRAVITY, URINE: >=1.03
POC UROBILINOGEN, URINE: 0.2 EU/DL

## 2025-06-04 PROCEDURE — 99214 OFFICE O/P EST MOD 30 MIN: CPT

## 2025-06-04 PROCEDURE — 81003 URINALYSIS AUTO W/O SCOPE: CPT | Performed by: STUDENT IN AN ORGANIZED HEALTH CARE EDUCATION/TRAINING PROGRAM

## 2025-06-04 PROCEDURE — 99204 OFFICE O/P NEW MOD 45 MIN: CPT | Performed by: STUDENT IN AN ORGANIZED HEALTH CARE EDUCATION/TRAINING PROGRAM

## 2025-06-04 PROCEDURE — 99214 OFFICE O/P EST MOD 30 MIN: CPT | Performed by: STUDENT IN AN ORGANIZED HEALTH CARE EDUCATION/TRAINING PROGRAM

## 2025-06-04 PROCEDURE — 1036F TOBACCO NON-USER: CPT

## 2025-06-04 RX ORDER — TAMSULOSIN HYDROCHLORIDE 0.4 MG/1
0.4 CAPSULE ORAL DAILY
Qty: 30 CAPSULE | Refills: 11 | Status: SHIPPED | OUTPATIENT
Start: 2025-06-04 | End: 2026-06-04

## 2025-06-04 RX ORDER — CEFAZOLIN SODIUM 2 G/100ML
2 INJECTION, SOLUTION INTRAVENOUS ONCE
Status: CANCELLED | OUTPATIENT
Start: 2025-06-04 | End: 2025-06-04

## 2025-06-04 ASSESSMENT — COLUMBIA-SUICIDE SEVERITY RATING SCALE - C-SSRS
1. IN THE PAST MONTH, HAVE YOU WISHED YOU WERE DEAD OR WISHED YOU COULD GO TO SLEEP AND NOT WAKE UP?: NO
6. HAVE YOU EVER DONE ANYTHING, STARTED TO DO ANYTHING, OR PREPARED TO DO ANYTHING TO END YOUR LIFE?: NO
2. HAVE YOU ACTUALLY HAD ANY THOUGHTS OF KILLING YOURSELF?: NO

## 2025-06-04 NOTE — PROGRESS NOTES
Chief complaint:  No chief complaint on file.  Referring physician:  No ref. provider found     SUBJECTIVE:    Medical history:   has a past medical history of Acquired absence of right leg below knee (Multi) (03/05/2021), Hypopituitarism (Multi) (05/25/2021), Implantable intrathecal infusion pump present, Morbid (severe) obesity due to excess calories (Multi) (03/05/2021), Nephrotic syndrome with focal and segmental glomerular lesions (03/05/2021), Other adrenocortical insufficiency (05/25/2021), and Vitamin D deficiency, unspecified (03/05/2021).   Surgical history:   has a past surgical history that includes Other surgical history (05/25/2021) and Leg Surgery (1996).  Family history:  family history includes Cancer in his maternal grandfather and paternal grandfather; Diabetes in his brother and paternal grandmother; Thyroid disease in his mother.  Social history:   reports that he has never smoked. He has never used smokeless tobacco. He reports that he does not drink alcohol and does not use drugs.    Medications:    Current Outpatient Medications   Medication Instructions    acetaminophen (TYLENOL) 650 mg, oral, Every 4 hours PRN    albuterol 2.5 mg /3 mL (0.083 %) nebulizer solution USE 3 ML VIA INHALER EVERY 6 HOURS AS NEEDED FOR WHEEZING/ SHORTNESS OF BREATH. INHALE BY NEBULIZER OVER 5 TO 15 MINUTES    ALBUTEROL INHL 2 Inhalers, inhalation    amitriptyline (Elavil) 25 mg tablet 1 tablet, oral, Nightly    budesonide-formoteroL (Symbicort) 160-4.5 mcg/actuation inhaler 2 puffs, inhalation, 2 times daily    calcium citrate (Calcitrate) 200 mg (950 mg) tablet 1 tablet, oral, Daily    diazePAM (VALIUM) 10 mg, oral, Every 12 hours PRN    diclofenac sodium (VOLTAREN) 2 g, Topical, 4 times daily    diphenhydrAMINE (BENADryl) 25 mg capsule oral    doxycycline (VIBRAMYCIN) 100 mg, oral, 2 times daily    EPINEPHrine 0.3 mg/0.3 mL injection syringe Use as directed for allergic reaction    Farxiga 10 mg 1 tablet, oral,  Daily with breakfast    fluticasone (Flonase) 50 mcg/actuation nasal spray 2 sprays, nasal, Daily    fluticasone propion-salmeteroL (Advair) 230-21 mcg/actuation inhaler USE 2 INHALATIONS AS INSTRUCTED TWICE A DAY. RINSE AND GARGLE MOUTH AFTER USE    furosemide (Lasix) 20 mg tablet 1 tablet, oral, As needed    ipratropium (Atrovent) 21 mcg (0.03 %) nasal spray USE 2 SPRAYS NASALLY TWICE A DAY    levothyroxine (Synthroid, Levoxyl) 100 mcg tablet TAKE 3 TABLETS ONCE DAILY IN THE MORNING BEFORE A MEAL    lidocaine (Lidoderm) 5 % patch May use 1 patch over affected area once daily, on skin for 12 hours, off skin for 12 hours    lidocaine-prilocaine (Emla) 2.5-2.5 % cream Apply to abdomen/pump site prior to pump refills prn    loratadine (CLARITIN) 10 mg, oral, Daily RT    losartan (COZAAR) 100 mg, oral, Daily RT    montelukast (Singulair) 10 mg tablet 1 tablet, oral, Nightly    multivitamin with minerals tablet oral    omega-3 acid ethyl esters (Lovaza) 1 gram capsule 2 capsules, oral, 2 times daily    polyethylene glycol 236-22.74-6.74 -5.86 gram solution take 4000 milliliters by mouth AS A ONE TIME DOSE REFER TO PRINTED INSTRUCTIONS FROM PROVIDER    predniSONE (DELTASONE) 5 mg, oral, 2 times daily, Double the dose if sick for 3 days     sildenafil (Viagra) 50 mg tablet take 1 tablet by mouth 1 hour prior to intercourse if needed    tamsulosin (FLOMAX) 0.4 mg, oral, Daily, Do not crush, chew, or split.    testosterone cypionate (DEPO-TESTOSTERONE) 200 mg, intramuscular, Every 14 days    testosterone cypionate 200 mg/mL kit 250 mg every 10 days    tiZANidine (ZANAFLEX) 4 mg, oral, Every 6 hours PRN    triamcinolone (Kenalog) 0.5 % cream apply thin layer to affected area twice a day      Allergies:    RX Allergies[1]     ROS:  14-point review of systems negative except as noted above.      HPI:  Bryan Myers is a 55 y.o. male with a history of urolithiasis who presents for initial evaluation of stones  The patient was  "treated for a 1 cm lower pole renal stone in December 2024. At that time, he presented with a similar clinical picture, and his symptoms resolved after treatment of the stone. Ureteral stent placement was required due to a narrow ureter. A current CT scan shows an 8 mm lower pole stone and some small fragments in the upper pole calyx. Urinalysis is within normal limits.    OBJECTIVE:  There were no vitals taken for this visit.There is no height or weight on file to calculate BMI.    Physical exam  General:  No acute distress  HEENT:  EOMI  CV:  Regular rate  Pulm:  Nonlabored respirations  Abd:  Soft, non-distended  :  No suprapubic or CVA tenderness  MSK:  No contractures  Neuro:  Motor intact  Psych:  Appropriate affect    Labs:    I have personally reviewed your lab tests  Lab Results   Component Value Date    WBC 11.7 (H) 06/03/2025    HGB 16.9 06/03/2025    HCT 51.0 06/03/2025     06/03/2025    ALT 35 06/03/2025    AST 35 06/03/2025     06/03/2025    K 3.9 06/03/2025     06/03/2025    CREATININE 1.43 (H) 06/03/2025    BUN 15 06/03/2025    CO2 27 06/03/2025    INR 1.0 04/27/2025    HGBA1C 4.8 02/18/2025   No results found for: \"URINECULTURE\" No results found for: \"PSA\"    Imaging:    I have personally reviewed images    ASSESSMENT:   Bryan Myers is a 55 y.o. male presenting with    Symptoms: back pain  NO previous stent , previous procedure: RIRS right side   stone characteristics: 8 mm 1000 HU  [employer] alternatives: stent first, then RIRS   Risk: low  PLAN:  Stent  2.  Flomax   Problem List Items Addressed This Visit    None  Visit Diagnoses         Kidney stone        Relevant Medications    tamsulosin (Flomax) 0.4 mg 24 hr capsule             Follow-up 2 weeks after the surgery    Rustam Gonzalez MD    Problem List Items Addressed This Visit    None  Visit Diagnoses         Kidney stone        Relevant Medications    tamsulosin (Flomax) 0.4 mg 24 hr capsule                [1] "   Allergies  Allergen Reactions    Bee Venom Protein (Honey Bee) Shortness of breath    Adhesive Tape-Silicones Itching     redness    Haloperidol Itching     psychosis    Adhesive Rash

## 2025-06-06 ENCOUNTER — HOSPITAL ENCOUNTER (OUTPATIENT)
Facility: HOSPITAL | Age: 55
Setting detail: OUTPATIENT SURGERY
Discharge: HOME | End: 2025-06-06
Attending: UROLOGY | Admitting: UROLOGY
Payer: COMMERCIAL

## 2025-06-06 ENCOUNTER — ANESTHESIA EVENT (OUTPATIENT)
Dept: OPERATING ROOM | Facility: HOSPITAL | Age: 55
End: 2025-06-06
Payer: COMMERCIAL

## 2025-06-06 ENCOUNTER — ANESTHESIA (OUTPATIENT)
Dept: OPERATING ROOM | Facility: HOSPITAL | Age: 55
End: 2025-06-06
Payer: COMMERCIAL

## 2025-06-06 ENCOUNTER — APPOINTMENT (OUTPATIENT)
Dept: RADIOLOGY | Facility: HOSPITAL | Age: 55
End: 2025-06-06
Payer: COMMERCIAL

## 2025-06-06 VITALS
RESPIRATION RATE: 18 BRPM | DIASTOLIC BLOOD PRESSURE: 98 MMHG | HEIGHT: 70 IN | TEMPERATURE: 97.2 F | OXYGEN SATURATION: 95 % | WEIGHT: 263.23 LBS | BODY MASS INDEX: 37.68 KG/M2 | SYSTOLIC BLOOD PRESSURE: 161 MMHG | HEART RATE: 72 BPM

## 2025-06-06 DIAGNOSIS — N20.0 CALCULUS OF KIDNEY: Primary | ICD-10-CM

## 2025-06-06 PROCEDURE — 7100000010 HC PHASE TWO TIME - EACH INCREMENTAL 1 MINUTE: Performed by: UROLOGY

## 2025-06-06 PROCEDURE — 2500000004 HC RX 250 GENERAL PHARMACY W/ HCPCS (ALT 636 FOR OP/ED): Performed by: NURSE ANESTHETIST, CERTIFIED REGISTERED

## 2025-06-06 PROCEDURE — 3600000008 HC OR TIME - EACH INCREMENTAL 1 MINUTE - PROCEDURE LEVEL THREE: Performed by: UROLOGY

## 2025-06-06 PROCEDURE — 3700000001 HC GENERAL ANESTHESIA TIME - INITIAL BASE CHARGE: Performed by: UROLOGY

## 2025-06-06 PROCEDURE — 7100000009 HC PHASE TWO TIME - INITIAL BASE CHARGE: Performed by: UROLOGY

## 2025-06-06 PROCEDURE — 7100000002 HC RECOVERY ROOM TIME - EACH INCREMENTAL 1 MINUTE: Performed by: UROLOGY

## 2025-06-06 PROCEDURE — 7100000001 HC RECOVERY ROOM TIME - INITIAL BASE CHARGE: Performed by: UROLOGY

## 2025-06-06 PROCEDURE — C1769 GUIDE WIRE: HCPCS | Performed by: UROLOGY

## 2025-06-06 PROCEDURE — 2550000001 HC RX 255 CONTRASTS: Performed by: UROLOGY

## 2025-06-06 PROCEDURE — 2500000005 HC RX 250 GENERAL PHARMACY W/O HCPCS: Performed by: ANESTHESIOLOGY

## 2025-06-06 PROCEDURE — 74420 UROGRAPHY RTRGR +-KUB: CPT | Performed by: UROLOGY

## 2025-06-06 PROCEDURE — 76000 FLUOROSCOPY <1 HR PHYS/QHP: CPT | Mod: RT

## 2025-06-06 PROCEDURE — C2617 STENT, NON-COR, TEM W/O DEL: HCPCS | Performed by: UROLOGY

## 2025-06-06 PROCEDURE — 3600000003 HC OR TIME - INITIAL BASE CHARGE - PROCEDURE LEVEL THREE: Performed by: UROLOGY

## 2025-06-06 PROCEDURE — 2720000007 HC OR 272 NO HCPCS: Performed by: UROLOGY

## 2025-06-06 PROCEDURE — 3700000002 HC GENERAL ANESTHESIA TIME - EACH INCREMENTAL 1 MINUTE: Performed by: UROLOGY

## 2025-06-06 PROCEDURE — 2500000005 HC RX 250 GENERAL PHARMACY W/O HCPCS: Performed by: UROLOGY

## 2025-06-06 PROCEDURE — 2780000003 HC OR 278 NO HCPCS: Performed by: UROLOGY

## 2025-06-06 PROCEDURE — C1758 CATHETER, URETERAL: HCPCS | Performed by: UROLOGY

## 2025-06-06 PROCEDURE — 52332 CYSTOSCOPY AND TREATMENT: CPT | Performed by: UROLOGY

## 2025-06-06 DEVICE — STENT, TRIA URETHERAL, SOFT, 6 X  26: Type: IMPLANTABLE DEVICE | Site: URETER | Status: FUNCTIONAL

## 2025-06-06 RX ORDER — FENTANYL CITRATE 50 UG/ML
INJECTION, SOLUTION INTRAMUSCULAR; INTRAVENOUS AS NEEDED
Status: DISCONTINUED | OUTPATIENT
Start: 2025-06-06 | End: 2025-06-06

## 2025-06-06 RX ORDER — MIDAZOLAM HYDROCHLORIDE 1 MG/ML
INJECTION INTRAMUSCULAR; INTRAVENOUS AS NEEDED
Status: DISCONTINUED | OUTPATIENT
Start: 2025-06-06 | End: 2025-06-06

## 2025-06-06 RX ORDER — PHENYLEPHRINE HCL IN 0.9% NACL 1 MG/10 ML
SYRINGE (ML) INTRAVENOUS AS NEEDED
Status: DISCONTINUED | OUTPATIENT
Start: 2025-06-06 | End: 2025-06-06

## 2025-06-06 RX ORDER — OXYCODONE HYDROCHLORIDE 5 MG/1
5 TABLET ORAL EVERY 4 HOURS PRN
Status: DISCONTINUED | OUTPATIENT
Start: 2025-06-06 | End: 2025-06-06 | Stop reason: HOSPADM

## 2025-06-06 RX ORDER — PHENAZOPYRIDINE HYDROCHLORIDE 200 MG/1
200 TABLET, FILM COATED ORAL 3 TIMES DAILY
Qty: 9 TABLET | Refills: 0 | Status: SHIPPED | OUTPATIENT
Start: 2025-06-06 | End: 2025-06-09

## 2025-06-06 RX ORDER — SODIUM CHLORIDE 0.9 G/100ML
INJECTION, SOLUTION IRRIGATION AS NEEDED
Status: DISCONTINUED | OUTPATIENT
Start: 2025-06-06 | End: 2025-06-06 | Stop reason: HOSPADM

## 2025-06-06 RX ORDER — CEFAZOLIN SODIUM 2 G/50ML
2 SOLUTION INTRAVENOUS ONCE
Status: DISCONTINUED | OUTPATIENT
Start: 2025-06-06 | End: 2025-06-06 | Stop reason: HOSPADM

## 2025-06-06 RX ORDER — SODIUM CHLORIDE, SODIUM LACTATE, POTASSIUM CHLORIDE, CALCIUM CHLORIDE 600; 310; 30; 20 MG/100ML; MG/100ML; MG/100ML; MG/100ML
100 INJECTION, SOLUTION INTRAVENOUS CONTINUOUS
Status: DISCONTINUED | OUTPATIENT
Start: 2025-06-06 | End: 2025-06-06 | Stop reason: HOSPADM

## 2025-06-06 RX ORDER — ACETAMINOPHEN 325 MG/1
650 TABLET ORAL EVERY 4 HOURS PRN
Status: DISCONTINUED | OUTPATIENT
Start: 2025-06-06 | End: 2025-06-06 | Stop reason: HOSPADM

## 2025-06-06 RX ORDER — PROPOFOL 10 MG/ML
INJECTION, EMULSION INTRAVENOUS AS NEEDED
Status: DISCONTINUED | OUTPATIENT
Start: 2025-06-06 | End: 2025-06-06

## 2025-06-06 RX ORDER — CEFAZOLIN 1 G/1
INJECTION, POWDER, FOR SOLUTION INTRAVENOUS AS NEEDED
Status: DISCONTINUED | OUTPATIENT
Start: 2025-06-06 | End: 2025-06-06

## 2025-06-06 RX ORDER — LIDOCAINE HYDROCHLORIDE 20 MG/ML
INJECTION, SOLUTION INFILTRATION; PERINEURAL AS NEEDED
Status: DISCONTINUED | OUTPATIENT
Start: 2025-06-06 | End: 2025-06-06

## 2025-06-06 RX ORDER — LIDOCAINE HYDROCHLORIDE 10 MG/ML
0.1 INJECTION, SOLUTION EPIDURAL; INFILTRATION; INTRACAUDAL; PERINEURAL ONCE
Status: DISCONTINUED | OUTPATIENT
Start: 2025-06-06 | End: 2025-06-06 | Stop reason: HOSPADM

## 2025-06-06 RX ADMIN — Medication 150 MCG: at 07:47

## 2025-06-06 RX ADMIN — Medication 6 L/MIN: at 08:06

## 2025-06-06 RX ADMIN — MIDAZOLAM HYDROCHLORIDE 2 MG: 1 INJECTION, SOLUTION INTRAMUSCULAR; INTRAVENOUS at 07:30

## 2025-06-06 RX ADMIN — Medication 200 MCG: at 07:51

## 2025-06-06 RX ADMIN — PROPOFOL 200 MG: 10 INJECTION, EMULSION INTRAVENOUS at 07:42

## 2025-06-06 RX ADMIN — LIDOCAINE HYDROCHLORIDE 80 MG: 20 INJECTION, SOLUTION INFILTRATION; PERINEURAL at 07:42

## 2025-06-06 RX ADMIN — CEFAZOLIN 3 G: 1 INJECTION, POWDER, FOR SOLUTION INTRAMUSCULAR; INTRAVENOUS at 07:43

## 2025-06-06 RX ADMIN — DEXAMETHASONE SODIUM PHOSPHATE 4 MG: 4 INJECTION, SOLUTION INTRAMUSCULAR; INTRAVENOUS at 07:44

## 2025-06-06 RX ADMIN — Medication 200 MCG: at 07:55

## 2025-06-06 RX ADMIN — FENTANYL CITRATE 50 MCG: 50 INJECTION, SOLUTION INTRAMUSCULAR; INTRAVENOUS at 07:42

## 2025-06-06 RX ADMIN — Medication 250 MCG: at 07:58

## 2025-06-06 SDOH — HEALTH STABILITY: MENTAL HEALTH: CURRENT SMOKER: 0

## 2025-06-06 ASSESSMENT — PAIN - FUNCTIONAL ASSESSMENT
PAIN_FUNCTIONAL_ASSESSMENT: 0-10

## 2025-06-06 ASSESSMENT — PAIN SCALES - GENERAL
PAINLEVEL_OUTOF10: 0 - NO PAIN
PAINLEVEL_OUTOF10: 7
PAINLEVEL_OUTOF10: 0 - NO PAIN
PAINLEVEL_OUTOF10: 0 - NO PAIN
PAIN_LEVEL: 0
PAINLEVEL_OUTOF10: 0 - NO PAIN

## 2025-06-06 ASSESSMENT — PAIN DESCRIPTION - DESCRIPTORS: DESCRIPTORS: ACHING

## 2025-06-06 NOTE — ANESTHESIA PREPROCEDURE EVALUATION
Patient: Bryan Myers    Procedure Information       Date/Time: 06/06/25 0715    Procedure: Cystoscopy; Right Ureteral Stent Insertion (Right: Ureter)    Location: [facility] A OR 09 / [facility] A OR    Surgeons: Dennis Sotelo MD            Relevant Problems   /Renal   (+) Calculus of kidney       Clinical information reviewed:    Allergies                 Medical History[1]   Surgical History[2]  Social History[3]   Current Outpatient Medications   Medication Instructions    acetaminophen (TYLENOL) 650 mg, oral, Every 4 hours PRN    albuterol 2.5 mg /3 mL (0.083 %) nebulizer solution USE 3 ML VIA INHALER EVERY 6 HOURS AS NEEDED FOR WHEEZING/ SHORTNESS OF BREATH. INHALE BY NEBULIZER OVER 5 TO 15 MINUTES    ALBUTEROL INHL 2 Inhalers, inhalation    amitriptyline (Elavil) 25 mg tablet 1 tablet, oral, Nightly    budesonide-formoteroL (Symbicort) 160-4.5 mcg/actuation inhaler 2 puffs, inhalation, 2 times daily    calcium citrate (Calcitrate) 200 mg (950 mg) tablet 1 tablet, oral, Daily    diazePAM (VALIUM) 10 mg, oral, Every 12 hours PRN    diclofenac sodium (VOLTAREN) 2 g, Topical, 4 times daily    diphenhydrAMINE (BENADryl) 25 mg capsule oral    doxycycline (VIBRAMYCIN) 100 mg, oral, 2 times daily    EPINEPHrine 0.3 mg/0.3 mL injection syringe Use as directed for allergic reaction    Farxiga 10 mg 1 tablet, oral, Daily with breakfast    fluticasone (Flonase) 50 mcg/actuation nasal spray 2 sprays, nasal, Daily    fluticasone propion-salmeteroL (Advair) 230-21 mcg/actuation inhaler USE 2 INHALATIONS AS INSTRUCTED TWICE A DAY. RINSE AND GARGLE MOUTH AFTER USE    furosemide (Lasix) 20 mg tablet 1 tablet, oral, As needed    ipratropium (Atrovent) 21 mcg (0.03 %) nasal spray USE 2 SPRAYS NASALLY TWICE A DAY    levothyroxine (Synthroid, Levoxyl) 100 mcg tablet TAKE 3 TABLETS ONCE DAILY IN THE MORNING BEFORE A MEAL    lidocaine (Lidoderm) 5 % patch May use 1 patch over affected area once daily, on skin for 12 hours, off skin  for 12 hours    lidocaine-prilocaine (Emla) 2.5-2.5 % cream Apply to abdomen/pump site prior to pump refills prn    loratadine (CLARITIN) 10 mg, oral, Daily RT    losartan (COZAAR) 100 mg, oral, Daily RT    montelukast (Singulair) 10 mg tablet 1 tablet, oral, Nightly    multivitamin with minerals tablet oral    omega-3 acid ethyl esters (Lovaza) 1 gram capsule 2 capsules, oral, 2 times daily    polyethylene glycol 236-22.74-6.74 -5.86 gram solution take 4000 milliliters by mouth AS A ONE TIME DOSE REFER TO PRINTED INSTRUCTIONS FROM PROVIDER    predniSONE (DELTASONE) 5 mg, oral, 2 times daily, Double the dose if sick for 3 days     sildenafil (Viagra) 50 mg tablet take 1 tablet by mouth 1 hour prior to intercourse if needed    tamsulosin (FLOMAX) 0.4 mg, oral, Daily, Do not crush, chew, or split.    testosterone cypionate (DEPO-TESTOSTERONE) 200 mg, intramuscular, Every 14 days    testosterone cypionate 200 mg/mL kit 250 mg every 10 days    tiZANidine (ZANAFLEX) 4 mg, oral, Every 6 hours PRN    triamcinolone (Kenalog) 0.5 % cream apply thin layer to affected area twice a day      RX Allergies[4]     Chemistry    Lab Results   Component Value Date/Time     06/03/2025 1702    K 3.9 06/03/2025 1702     06/03/2025 1702    CO2 27 06/03/2025 1702    BUN 15 06/03/2025 1702    CREATININE 1.43 (H) 06/03/2025 1702    Lab Results   Component Value Date/Time    CALCIUM 9.3 06/03/2025 1702    ALKPHOS 81 06/03/2025 1702    AST 35 06/03/2025 1702    ALT 35 06/03/2025 1702    BILITOT 0.6 06/03/2025 1702          Lab Results   Component Value Date    HGBA1C 4.8 02/18/2025     Lab Results   Component Value Date/Time    WBC 11.7 (H) 06/03/2025 1702    HGB 16.9 06/03/2025 1702    HCT 51.0 06/03/2025 1702     06/03/2025 1702     Lab Results   Component Value Date/Time    PROTIME 11.0 04/27/2025 1603    INR 1.0 04/27/2025 1603     Encounter Date: 04/27/25   ECG 12 lead   Result Value    Ventricular Rate 87    Atrial  Rate 87    CT Interval 174    QRS Duration 98    QT Interval 384    QTC Calculation(Bazett) 462    P Axis 41    R Axis -23    T Axis 50    QRS Count 14    Q Onset 218    P Onset 131    P Offset 193    T Offset 410    QTC Fredericia 434    Narrative    Normal sinus rhythm  Incomplete right bundle branch block  Borderline ECG  No previous ECGs available  See ED provider note for full interpretation and clinical correlation  Confirmed by Wendie Olivier ([id number]) on 5/2/2025 11:21:15 AM      No results found for this or any previous visit from the past 1095 days.        Visit Vitals  Smoking Status Never     No data recorded    Physical Exam    Airway  Mallampati: II  TM distance: >3 FB  Neck ROM: full  Mouth opening: 3 or more finger widths     Cardiovascular - normal exam   Dental - normal exam     Pulmonary - normal exam   Abdominal - normal exam(+) obese             Anesthesia Plan    History of general anesthesia?: yes  History of complications of general anesthesia?: no    ASA 2     general     The patient is not a current smoker.    intravenous induction   Postoperative pain plan includes opioids.  Anesthetic plan and risks discussed with patient.  Use of blood products discussed with patient who.    Plan discussed with attending and CRNA.             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    Implantable intrathecal infusion pump present     Morbid (severe) obesity due to excess calories (Multi) 03/05/2021    Obesity, Class III, BMI 40-49.9 (morbid obesity)    Nephrotic syndrome with focal and segmental glomerular lesions 03/05/2021    FSGS (focal segmental glomerulosclerosis) with nephrosis    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    Secondary adrenal insufficiency    Vitamin D deficiency, unspecified 03/05/2021     Hypovitaminosis D   [2]   Past Surgical History:  Procedure Laterality Date    LEG SURGERY  1996    29 surgeries before amputation    OTHER SURGICAL HISTORY  05/25/2021    Lower extremity amputation below knee   [3]   Social History  Tobacco Use    Smoking status: Never    Smokeless tobacco: Never   Vaping Use    Vaping status: Never Used   Substance Use Topics    Alcohol use: Never    Drug use: Never   [4]   Allergies  Allergen Reactions    Bee Venom Protein (Honey Bee) Shortness of breath    Adhesive Tape-Silicones Itching     redness    Haloperidol Itching     psychosis    Adhesive Rash

## 2025-06-06 NOTE — ANESTHESIA PROCEDURE NOTES
Airway  Date/Time: 6/6/2025 7:43 AM  Reason: elective    Airway not difficult    Staffing  Performed: CRNA   Authorized by: Ethan Lundberg MD    Performed by: JAC Shoemaker-DAWIT  Patient location during procedure: OR    Patient Condition  Indications for airway management: anesthesia  Patient position: sniffing  MILS maintained throughout  No planned trial extubation  Sedation level: deep     Final Airway Details   Preoxygenated: yes  Final airway type: supraglottic airway  Successful airway:   Size: 5    Additional Comments  Igel 5

## 2025-06-06 NOTE — OP NOTE
Cystoscopy; Right Ureteral Stent Insertion (R) Operative Note     Date: 2025  OR Location: [facility] OR    Name: Bryan Myers, : 1970, Age: 55 y.o., MRN: [id number], Sex: male    Diagnosis  Pre-op Diagnosis      * Calculus of kidney [N20.0] Post-op Diagnosis     * Calculus of kidney [N20.0]     Procedures  Cystoscopy; Right Ureteral Stent Insertion  72240 - GA CYSTO W/INSERT URETERAL STENT      Surgeons      * Dennis Sotelo - Primary    Resident/Fellow/Other Assistant:  Surgeons and Role:     * Kortney Gonzalez MD - Resident - Assisting    Staff:   Circulator: Tri Infnate Person: Shanna    Anesthesia Staff: Anesthesiologist: Ethan Lundberg MD  CRNA: JAC Shoemaker-DAWIT    Procedure Summary  Anesthesia: General  ASA: II  Estimated Blood Loss: nil mL  Intra-op Medications:   Administrations occurring from 0715 to 0815 on 25:   Medication Name Total Dose   sodium chloride 0.9 % irrigation solution 1,000 mL   iohexol (OMNIPaque) 240 mg iodine/mL solution 5 mL   ceFAZolin (Ancef) vial 1 g 3 g              Anesthesia Record               Intraprocedure I/O Totals       None           Specimen: No specimens collected              Drains and/or Catheters: * None in log *    Tourniquet Times:         Implants:     Findings: 1. Normal bladder  2. R RPG without hydro or filling defects  3. 6x26JJ on R without a string    Indications: Bryan Myers is an 55 y.o. male who is having surgery for Calculus of kidney [N20.0].     The patient was seen in the preoperative area. The risks, benefits, complications, treatment options, non-operative alternatives, expected recovery and outcomes were discussed with the patient. The possibilities of reaction to medication, pulmonary aspiration, injury to surrounding structures, bleeding, recurrent infection, the need for additional procedures, failure to diagnose a condition, and creating a complication requiring transfusion or operation were discussed with  the patient. The patient concurred with the proposed plan, giving informed consent.  The site of surgery was properly noted/marked if necessary per policy. The patient has been actively warmed in preoperative area. Preoperative antibiotics have been ordered and given within 1 hours of incision. Venous thrombosis prophylaxis have been ordered including bilateral sequential compression devices    Procedure Details:   The patient was brought to the OR and after good general anesthesia was achieved, the patient was prepped and draped in dorsal lithotomy position. All pressure points were padded.  Surgical pause was performed.  The patient was identified using 2 identifiers.  The correct surgical procedure was confirmed.  All members of surgical and anesthesia team were in agreement.  The patient received prophylactic antibiotic and the procedure started.    Cystoscopy: The patient's bladder was first entered with a 22-Armenian rigid cystoscope with 30-degree lens.  Cystoscopic examination showed normal anterior urethra.  The posterior urethra showed trilobar growth.  Both ureteral orifices were identified away from the bladder neck.     A sensor wire was placed into the righ ureter and into the kidney under flouroscopic guidance. An open ended catheter was advanced to the distal ureter, the wire was removed, and a retrograde as shot. Retrograde showed no hydro or filling defects. The wire was replaced, the open ended catheter was removed, and a 6x26 JJ stent was placed with a good curl seen radiographically in the upper pole and the bladder on direct visualization. Bladder was then drained and instruments were removed.    The patient tolerated the procedure well and was shifted to recovery in good general condition   Evidence of Infection: No   Complications:  None; patient tolerated the procedure well.    Disposition: PACU - hemodynamically stable.  Condition: stable                 Additional Details:     Attending  Attestation: I was present and scrubbed for the entire procedure.    Dennis Sotelo  Phone Number: [phone]

## 2025-06-06 NOTE — DISCHARGE INSTRUCTIONS
DEPARTMENT OF UROLOGY  DISCHARGE INSTRUCTIONS CYSTOSCOPY STENT INSERTION  Outpatient Surgery    C O N F I D E N T I A L   I N F O R M A T I O N    Bryan Myers      Call [phone] during regular daytime business hours (8:00 am - 5:00 pm) and after 5:00 pm  and ask for the Urology resident with any questions or concerns.      If it is a life-threatening situation, proceed to the nearest emergency department.           Thank you for the opportunity to care for you today.  Your health and healing are very important to us.  We hope we made you feel as comfortable as possible and are committed to your recovery and continued well-being.      The following is a brief overview of your cystoscopy stent insertion procedure today. Some of the information contained on this summary may be confidential.  This information should be kept in your records and should be shared with your regular doctor.    Physicians:   Dr. Sotelo      Procedure performed: cystoscopy (looked inside your bladder) with insertion of a new stent/exchange of previous stent  Pending results:  none     What to Expect During your Recovery and Home Care  Anesthesia Side Effects   You received general anesthesia today.  You may feel sleepy, tired, or have a sore throat.   You may also feel drowsiness, dizziness, or inability to think clearly.  For your safety, do not drive, drink alcoholic beverages, take any unprescribed medication or make any important decisions for 24 hours.  A responsible adult should be with you for 24 hours.        Activity and Recovery    No heavy lifting today. Rest for the next 24 hours.     Pain Control  Unfortunately, you may experience pain after your procedure.  Frequency and urgency to urinate and mild discomfort are expected. Adequate pain management can include alternative measures to help ease your pain and that can include over the counter Tylenol/ibuprofen and a heating pad. Do not take more than 4,000mg of Tylenol in a  24-hour period.      You may have also been prescribed Pyridium (for burning sensation) and Ditropan(for bladder spasms) for pain control. Pyridium will turn your urine bright orange.    Nausea/Vomiting   Clear liquids are best tolerated at first. Start slow, advance your diet as tolerated to normal foods. Avoid spicy, greasy, heavy foods at first. Also, you may feel nauseous or like you need to vomit if you take any type of medication on an empty stomach.  Call your physician if you are unable to eat or drink and have persistent vomiting.    Signs of Bleeding   You could have some blood in your urine off and on over the next several weeks. Your urine will be light pink to yellow.    Treatment/wound care:   It is okay to shower after surgery.      Signs of Infection  Signs of infection can include fever, drainage(green/yellow), chills, burning sensation with passing of urine, or severe abdominal pain.  If you see any of these occur, please contact your doctor's office at [phone].  Any fever higher than 100.4, especially if associated with an ill feeling, abdominal pain, chills, or nausea should be reported to your surgeon.      Assist in bowel movements/urination  Increase fiber in diet  Increase water (6 to 8 glasses)  Increase walking   Urination should occur within 6 hours of anesthesia  If you have tried these methods and your bladder still feels full and you cannot use the bathroom, please go to your nearest Emergency room/contact your physician.    Additional Instructions:     You had a stent placed today from your kidney down into your bladder. This helps keep the urinary tract open and prevents blockages of urine flow. The stent can be irritating and can cause some frequency, urgency and blood in your urine when you go to the bathroom. It is important to drink plenty of water and take medications as prescribed. You may be sent home with Pyridium which turns your urine bright orange. Applying a heating  pad to your back will also help with this kind of pain.

## 2025-06-06 NOTE — ANESTHESIA POSTPROCEDURE EVALUATION
Patient: Bryan Myers    Procedure Summary       Date: 06/06/25 Room / Location: [facility] A OR 09 / Virtual [facility] OR    Anesthesia Start: 0730 Anesthesia Stop: 0806    Procedure: Cystoscopy; Right Ureteral Stent Insertion (Right: Ureter) Diagnosis:       Calculus of kidney      (Calculus of kidney [N20.0])    Surgeons: Dennis Sotelo MD Responsible Provider: Ethan Lundberg MD    Anesthesia Type: general ASA Status: 2            Anesthesia Type: general    Vitals Value Taken Time   /86 06/06/25 08:16   Temp 36.0 06/06/25 08:22   Pulse 81 06/06/25 08:22   Resp 20 06/06/25 08:22   SpO2 96 % 06/06/25 08:22   Vitals shown include unfiled device data.    Anesthesia Post Evaluation    Patient location during evaluation: PACU  Patient participation: complete - patient participated  Level of consciousness: awake and alert  Pain score: 0  Pain management: adequate  Airway patency: patent  Cardiovascular status: acceptable  Respiratory status: acceptable  Hydration status: acceptable  Postoperative Nausea and Vomiting: none        There were no known notable events for this encounter.

## 2025-06-06 NOTE — POST-PROCEDURE NOTE
0916 Pt in phase 2/ family at bedside    0918 Pt get dress with family    0924 Discharge instruction reviewed with pt and family by nurse    0935 IV removed

## 2025-06-06 NOTE — H&P
"History Of Present Illness  Bryan Myers is a 55 y.o. male presenting with right renal pelvic stone, here for right stent placement.     Past Medical History  Medical History[1]    Surgical History  Surgical History[2]     Social History  He reports that he has never smoked. He has never used smokeless tobacco. He reports current drug use. Frequency: 1.00 time per week. Drug: Marijuana. He reports that he does not drink alcohol.    Family History  Family History[3]     Allergies  Bee venom protein (honey bee), Adhesive tape-silicones, Haloperidol, and Adhesive    Review of Systems   All other systems reviewed and are negative.       Physical Exam  Constitutional:       General: He is not in acute distress.  HENT:      Head: Normocephalic and atraumatic.      Mouth/Throat:      Mouth: Mucous membranes are moist.   Eyes:      Extraocular Movements: Extraocular movements intact.      Pupils: Pupils are equal, round, and reactive to light.   Cardiovascular:      Rate and Rhythm: Normal rate and regular rhythm.   Pulmonary:      Effort: Pulmonary effort is normal.   Abdominal:      General: Abdomen is flat.      Palpations: Abdomen is soft.   Musculoskeletal:         General: Normal range of motion.      Cervical back: Normal range of motion and neck supple.   Skin:     General: Skin is warm and dry.   Neurological:      Mental Status: He is alert and oriented to person, place, and time.   Psychiatric:         Mood and Affect: Mood normal.          Last Recorded Vitals  Blood pressure (!) 158/93, pulse 87, temperature 36 °C (96.8 °F), temperature source Temporal, resp. rate 21, height 1.778 m (5' 10\"), weight 119 kg (263 lb 3.7 oz), SpO2 95%.    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    Implantable intrathecal infusion pump present     Morbid (severe) obesity due to excess calories (Multi) 03/05/2021    Obesity, Class III, BMI 40-49.9 (morbid obesity)    Nephrotic syndrome with focal and segmental glomerular lesions 03/05/2021    FSGS (focal segmental glomerulosclerosis) with nephrosis    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

## 2025-06-06 NOTE — ANESTHESIA POSTPROCEDURE EVALUATION
Patient: Bryan Myers    Procedure Summary       Date: 06/06/25 Room / Location: [facility] A OR 09 / Virtual U A OR    Anesthesia Start: 0730 Anesthesia Stop: 0806    Procedure: Cystoscopy; Right Ureteral Stent Insertion (Right: Ureter) Diagnosis:       Calculus of kidney      (Calculus of kidney [N20.0])    Surgeons: Dennis Sotelo MD Responsible Provider: Ethan Lundberg MD    Anesthesia Type: general ASA Status: 2            Anesthesia Type: general    Vitals Value Taken Time   /79 06/06/25 08:35   Temp 36.2 °C (97.2 °F) 06/06/25 08:30   Pulse 79 06/06/25 08:43   Resp 18 06/06/25 08:30   SpO2 94 % 06/06/25 08:43   Vitals shown include unfiled device data.    Anesthesia Post Evaluation    Patient location during evaluation: PACU  Patient participation: complete - patient participated  Level of consciousness: awake  Pain management: adequate  Airway patency: patent  Cardiovascular status: acceptable  Respiratory status: acceptable  Hydration status: acceptable  Postoperative Nausea and Vomiting: none        There were no known notable events for this encounter.

## 2025-06-09 ASSESSMENT — PAIN SCALES - GENERAL: PAINLEVEL_OUTOF10: 1

## 2025-06-12 ENCOUNTER — APPOINTMENT (OUTPATIENT)
Dept: UROLOGY | Facility: CLINIC | Age: 55
End: 2025-06-12
Payer: COMMERCIAL

## 2025-06-12 VITALS — TEMPERATURE: 98.7 F

## 2025-06-12 DIAGNOSIS — Z78.9: Primary | ICD-10-CM

## 2025-06-12 DIAGNOSIS — N20.0 CALCULUS OF KIDNEY: ICD-10-CM

## 2025-06-12 LAB
POC APPEARANCE, URINE: CLEAR
POC BILIRUBIN, URINE: NEGATIVE
POC BLOOD, URINE: ABNORMAL
POC COLOR, URINE: YELLOW
POC GLUCOSE, URINE: ABNORMAL MG/DL
POC KETONES, URINE: NEGATIVE MG/DL
POC LEUKOCYTES, URINE: NEGATIVE
POC NITRITE,URINE: NEGATIVE
POC PH, URINE: 6 PH
POC PROTEIN, URINE: ABNORMAL MG/DL
POC SPECIFIC GRAVITY, URINE: 1.02
POC UROBILINOGEN, URINE: 0.2 EU/DL

## 2025-06-12 PROCEDURE — 99214 OFFICE O/P EST MOD 30 MIN: CPT

## 2025-06-12 PROCEDURE — 81002 URINALYSIS NONAUTO W/O SCOPE: CPT

## 2025-06-12 PROCEDURE — 1036F TOBACCO NON-USER: CPT

## 2025-06-12 RX ORDER — CEFAZOLIN SODIUM 2 G/100ML
2 INJECTION, SOLUTION INTRAVENOUS ONCE
OUTPATIENT
Start: 2025-06-12 | End: 2025-06-12

## 2025-06-12 NOTE — PROGRESS NOTES
Chief complaint:  Nephrolithiasis  Referring physician:  No ref. 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   Surgical history:   has a past surgical history that includes Other surgical history (05/25/2021) and Leg Surgery (1996).  Family history:  family history includes Cancer in his maternal grandfather and paternal grandfather; Diabetes in his brother and paternal grandmother; Thyroid disease in his mother.  Social history:   reports that he has never smoked. He has never used smokeless tobacco. He reports current drug use. Frequency: 1.00 time per week. Drug: Marijuana. He reports that he does not drink alcohol.    Medications:    Current Outpatient Medications   Medication Instructions    acetaminophen (TYLENOL) 650 mg, Every 4 hours PRN    albuterol 2.5 mg /3 mL (0.083 %) nebulizer solution USE 3 ML VIA INHALER EVERY 6 HOURS AS NEEDED FOR WHEEZING/ SHORTNESS OF BREATH. INHALE BY NEBULIZER OVER 5 TO 15 MINUTES    ALBUTEROL INHL 2 Inhalers    amitriptyline (Elavil) 25 mg tablet 1 tablet, Nightly    budesonide-formoteroL (Symbicort) 160-4.5 mcg/actuation inhaler 2 puffs, 2 times daily    calcium citrate (Calcitrate) 200 mg (950 mg) tablet 1 tablet, Daily    diazePAM (VALIUM) 10 mg, oral, Every 12 hours PRN    diclofenac sodium (VOLTAREN) 2 g, 4 times daily    diphenhydrAMINE (BENADryl) 25 mg capsule Take by mouth.    doxycycline (VIBRAMYCIN) 100 mg, 2 times daily    EPINEPHrine 0.3 mg/0.3 mL injection syringe Use as directed for  allergic reaction    Farxiga 10 mg 1 tablet, Daily with breakfast    fluticasone (Flonase) 50 mcg/actuation nasal spray 2 sprays, Daily    fluticasone propion-salmeteroL (Advair) 230-21 mcg/actuation inhaler USE 2 INHALATIONS AS INSTRUCTED TWICE A DAY. RINSE AND GARGLE MOUTH AFTER USE    furosemide (Lasix) 20 mg tablet 1 tablet, As needed    ipratropium (Atrovent) 21 mcg (0.03 %) nasal spray USE 2 SPRAYS NASALLY TWICE A DAY    levothyroxine (Synthroid, Levoxyl) 100 mcg tablet TAKE 3 TABLETS ONCE DAILY IN THE MORNING BEFORE A MEAL    lidocaine (Lidoderm) 5 % patch May use 1 patch over affected area once daily, on skin for 12 hours, off skin for 12 hours    lidocaine-prilocaine (Emla) 2.5-2.5 % cream Apply to abdomen/pump site prior to pump refills prn    loratadine (CLARITIN) 10 mg, Daily RT    losartan (COZAAR) 100 mg, Daily RT    montelukast (Singulair) 10 mg tablet 1 tablet, Nightly    multivitamin with minerals tablet oral    omega-3 acid ethyl esters (Lovaza) 1 gram capsule 2 capsules, oral, 2 times daily    polyethylene glycol 236-22.74-6.74 -5.86 gram solution take 4000 milliliters by mouth AS A ONE TIME DOSE REFER TO PRINTED INSTRUCTIONS FROM PROVIDER    predniSONE (DELTASONE) 5 mg, oral, 2 times daily, Double the dose if sick for 3 days     sildenafil (Viagra) 50 mg tablet take 1 tablet by mouth 1 hour prior to intercourse if needed    tamsulosin (FLOMAX) 0.4 mg, oral, Daily, Do not crush, chew, or split.    testosterone cypionate (DEPO-TESTOSTERONE) 200 mg, intramuscular, Every 14 days    testosterone cypionate 200 mg/mL kit 250 mg every 10 days    tiZANidine (ZANAFLEX) 4 mg, oral, Every 6 hours PRN    triamcinolone (Kenalog) 0.5 % cream apply thin layer to affected area twice a day      Allergies:    RX Allergies[1]     ROS:  14-point review of systems negative except as noted above.      HPI:  Bryan Myers is a 55 y.o. male with a history of urolithiasis who presents for follow up of right kidney  "stone  Since the ureteral stent was placed, the patient reports significantly better pain control. This is most likely part of a chronic pain context, with a history of prior opioid use and urolithiasis, as well as multiple previous surgeries.    OBJECTIVE:  Visit Vitals  Temp 37.1 °C (98.7 °F) (Temporal)   There is no height or weight on file to calculate BMI.    Physical exam  General:  No acute distress  HEENT:  EOMI  CV:  Regular rate  Pulm:  Nonlabored respirations  Abd:  Soft, non-distended  :  No suprapubic or CVA tenderness  MSK:  No contractures  Neuro:  Motor intact  Psych:  Appropriate affect    Labs:    I have personally reviewed your lab tests  Lab Results   Component Value Date    WBC 11.7 (H) 06/03/2025    HGB 16.9 06/03/2025    HCT 51.0 06/03/2025     06/03/2025    ALT 35 06/03/2025    AST 35 06/03/2025     06/03/2025    K 3.9 06/03/2025     06/03/2025    CREATININE 1.43 (H) 06/03/2025    BUN 15 06/03/2025    CO2 27 06/03/2025    INR 1.0 04/27/2025    HGBA1C 4.8 02/18/2025   No results found for: \"URINECULTURE\" No results found for: \"PSA\"    Imaging:    I have personally reviewed images    ASSESSMENT:   Bryan Myers is a 55 y.o. male presenting with  kidney stones  For now, we will proceed in parallel with the metabolic evaluation. Surgery will be scheduled for the first week of July, with a prior urine culture. The patient will be referred to Dr. Waller in nephrology for the metabolic workup.  Symptoms: right flank pain  previous stent: Yes previous procedure: no  stone characteristics: 8 x 6 x 5 mm pelvic stone right side 1000 HU  [employer]ur[employer] alternatives: right RIRS  Risk: infection, urteral injury  PLAN:  Right RIRS  Problem List Items Addressed This Visit    None  Visit Diagnoses         Antibiotics started per pneumonia protocol    -  Primary    Relevant Orders    POCT UA (nonautomated) manually resulted (Completed)             Follow-up 3 weeks    Rustam Gonzalez, " MD             [1]   Allergies  Allergen Reactions    Bee Venom Protein (Honey Bee) Shortness of breath    Adhesive Tape-Silicones Itching     redness    Haloperidol Itching     psychosis    Adhesive Rash

## 2025-06-16 ENCOUNTER — HOSPITAL ENCOUNTER (OUTPATIENT)
Facility: HOSPITAL | Age: 55
Setting detail: OUTPATIENT SURGERY
End: 2025-06-16
Payer: COMMERCIAL

## 2025-06-16 ENCOUNTER — HOSPITAL ENCOUNTER (EMERGENCY)
Facility: HOSPITAL | Age: 55
Discharge: HOME | End: 2025-06-16
Attending: EMERGENCY MEDICINE
Payer: COMMERCIAL

## 2025-06-16 ENCOUNTER — APPOINTMENT (OUTPATIENT)
Dept: RADIOLOGY | Facility: HOSPITAL | Age: 55
End: 2025-06-16
Payer: COMMERCIAL

## 2025-06-16 VITALS
HEIGHT: 69 IN | RESPIRATION RATE: 18 BRPM | SYSTOLIC BLOOD PRESSURE: 178 MMHG | OXYGEN SATURATION: 96 % | HEART RATE: 71 BPM | TEMPERATURE: 97.8 F | DIASTOLIC BLOOD PRESSURE: 105 MMHG | WEIGHT: 265 LBS | BODY MASS INDEX: 39.25 KG/M2

## 2025-06-16 DIAGNOSIS — R10.9 RIGHT FLANK PAIN: Primary | ICD-10-CM

## 2025-06-16 DIAGNOSIS — N20.0 CALCULUS OF KIDNEY: ICD-10-CM

## 2025-06-16 LAB
ALBUMIN SERPL BCP-MCNC: 4.1 G/DL (ref 3.4–5)
ALP SERPL-CCNC: 69 U/L (ref 33–120)
ALT SERPL W P-5'-P-CCNC: 28 U/L (ref 10–52)
ANION GAP SERPL CALC-SCNC: 13 MMOL/L (ref 10–20)
APPEARANCE UR: CLEAR
AST SERPL W P-5'-P-CCNC: 24 U/L (ref 9–39)
BASOPHILS # BLD AUTO: 0.1 X10*3/UL (ref 0–0.1)
BASOPHILS NFR BLD AUTO: 0.6 %
BILIRUB SERPL-MCNC: 0.4 MG/DL (ref 0–1.2)
BILIRUB UR STRIP.AUTO-MCNC: NEGATIVE MG/DL
BUN SERPL-MCNC: 20 MG/DL (ref 6–23)
CALCIUM SERPL-MCNC: 9.3 MG/DL (ref 8.6–10.3)
CHLORIDE SERPL-SCNC: 105 MMOL/L (ref 98–107)
CO2 SERPL-SCNC: 25 MMOL/L (ref 21–32)
COLOR UR: ABNORMAL
CREAT SERPL-MCNC: 1.46 MG/DL (ref 0.5–1.3)
EGFRCR SERPLBLD CKD-EPI 2021: 57 ML/MIN/1.73M*2
EOSINOPHIL # BLD AUTO: 0.06 X10*3/UL (ref 0–0.7)
EOSINOPHIL NFR BLD AUTO: 0.4 %
ERYTHROCYTE [DISTWIDTH] IN BLOOD BY AUTOMATED COUNT: 13.8 % (ref 11.5–14.5)
GLUCOSE SERPL-MCNC: 108 MG/DL (ref 74–99)
GLUCOSE UR STRIP.AUTO-MCNC: ABNORMAL MG/DL
HCT VFR BLD AUTO: 48.9 % (ref 41–52)
HGB BLD-MCNC: 16 G/DL (ref 13.5–17.5)
IMM GRANULOCYTES # BLD AUTO: 0.1 X10*3/UL (ref 0–0.7)
IMM GRANULOCYTES NFR BLD AUTO: 0.6 % (ref 0–0.9)
INR PPP: 1 (ref 0.9–1.1)
KETONES UR STRIP.AUTO-MCNC: NEGATIVE MG/DL
LACTATE SERPL-SCNC: 1.5 MMOL/L (ref 0.4–2)
LACTATE SERPL-SCNC: 2.2 MMOL/L (ref 0.4–2)
LEUKOCYTE ESTERASE UR QL STRIP.AUTO: ABNORMAL
LYMPHOCYTES # BLD AUTO: 2.19 X10*3/UL (ref 1.2–4.8)
LYMPHOCYTES NFR BLD AUTO: 13.1 %
MCH RBC QN AUTO: 28.9 PG (ref 26–34)
MCHC RBC AUTO-ENTMCNC: 32.7 G/DL (ref 32–36)
MCV RBC AUTO: 88 FL (ref 80–100)
MONOCYTES # BLD AUTO: 1.11 X10*3/UL (ref 0.1–1)
MONOCYTES NFR BLD AUTO: 6.7 %
MUCOUS THREADS #/AREA URNS AUTO: ABNORMAL /LPF
NEUTROPHILS # BLD AUTO: 13.11 X10*3/UL (ref 1.2–7.7)
NEUTROPHILS NFR BLD AUTO: 78.6 %
NITRITE UR QL STRIP.AUTO: NEGATIVE
NRBC BLD-RTO: 0 /100 WBCS (ref 0–0)
PH UR STRIP.AUTO: 6 [PH]
PLATELET # BLD AUTO: 417 X10*3/UL (ref 150–450)
POTASSIUM SERPL-SCNC: 4 MMOL/L (ref 3.5–5.3)
PROT SERPL-MCNC: 7.7 G/DL (ref 6.4–8.2)
PROT UR STRIP.AUTO-MCNC: ABNORMAL MG/DL
PROTHROMBIN TIME: 11.1 SECONDS (ref 9.8–12.4)
RBC # BLD AUTO: 5.54 X10*6/UL (ref 4.5–5.9)
RBC # UR STRIP.AUTO: ABNORMAL MG/DL
RBC #/AREA URNS AUTO: >20 /HPF
SODIUM SERPL-SCNC: 139 MMOL/L (ref 136–145)
SP GR UR STRIP.AUTO: 1.02
UROBILINOGEN UR STRIP.AUTO-MCNC: NORMAL MG/DL
WBC # BLD AUTO: 16.7 X10*3/UL (ref 4.4–11.3)
WBC #/AREA URNS AUTO: ABNORMAL /HPF

## 2025-06-16 PROCEDURE — 83605 ASSAY OF LACTIC ACID: CPT | Performed by: NURSE PRACTITIONER

## 2025-06-16 PROCEDURE — 80053 COMPREHEN METABOLIC PANEL: CPT | Performed by: NURSE PRACTITIONER

## 2025-06-16 PROCEDURE — 96375 TX/PRO/DX INJ NEW DRUG ADDON: CPT

## 2025-06-16 PROCEDURE — 74176 CT ABD & PELVIS W/O CONTRAST: CPT | Performed by: RADIOLOGY

## 2025-06-16 PROCEDURE — 87040 BLOOD CULTURE FOR BACTERIA: CPT | Mod: AHULAB | Performed by: NURSE PRACTITIONER

## 2025-06-16 PROCEDURE — 2500000002 HC RX 250 W HCPCS SELF ADMINISTERED DRUGS (ALT 637 FOR MEDICARE OP, ALT 636 FOR OP/ED): Performed by: EMERGENCY MEDICINE

## 2025-06-16 PROCEDURE — 36415 COLL VENOUS BLD VENIPUNCTURE: CPT | Performed by: NURSE PRACTITIONER

## 2025-06-16 PROCEDURE — 96365 THER/PROPH/DIAG IV INF INIT: CPT

## 2025-06-16 PROCEDURE — 85610 PROTHROMBIN TIME: CPT | Performed by: NURSE PRACTITIONER

## 2025-06-16 PROCEDURE — 74176 CT ABD & PELVIS W/O CONTRAST: CPT

## 2025-06-16 PROCEDURE — 99284 EMERGENCY DEPT VISIT MOD MDM: CPT | Mod: 25 | Performed by: EMERGENCY MEDICINE

## 2025-06-16 PROCEDURE — 81001 URINALYSIS AUTO W/SCOPE: CPT | Performed by: NURSE PRACTITIONER

## 2025-06-16 PROCEDURE — 85025 COMPLETE CBC W/AUTO DIFF WBC: CPT | Performed by: NURSE PRACTITIONER

## 2025-06-16 PROCEDURE — 87086 URINE CULTURE/COLONY COUNT: CPT | Mod: AHULAB | Performed by: NURSE PRACTITIONER

## 2025-06-16 PROCEDURE — 96366 THER/PROPH/DIAG IV INF ADDON: CPT

## 2025-06-16 PROCEDURE — 2500000004 HC RX 250 GENERAL PHARMACY W/ HCPCS (ALT 636 FOR OP/ED): Performed by: EMERGENCY MEDICINE

## 2025-06-16 PROCEDURE — 2500000004 HC RX 250 GENERAL PHARMACY W/ HCPCS (ALT 636 FOR OP/ED): Performed by: NURSE PRACTITIONER

## 2025-06-16 RX ORDER — MORPHINE SULFATE 4 MG/ML
4 INJECTION, SOLUTION INTRAMUSCULAR; INTRAVENOUS ONCE
Status: COMPLETED | OUTPATIENT
Start: 2025-06-16 | End: 2025-06-16

## 2025-06-16 RX ORDER — OXYBUTYNIN CHLORIDE 5 MG/1
5 TABLET ORAL ONCE
Status: COMPLETED | OUTPATIENT
Start: 2025-06-16 | End: 2025-06-16

## 2025-06-16 RX ORDER — CEFTRIAXONE 2 G/50ML
2 INJECTION, SOLUTION INTRAVENOUS ONCE
Status: COMPLETED | OUTPATIENT
Start: 2025-06-16 | End: 2025-06-16

## 2025-06-16 RX ORDER — SODIUM CHLORIDE 9 MG/ML
0-150 INJECTION, SOLUTION INTRAVENOUS AS NEEDED
Status: DISCONTINUED | OUTPATIENT
Start: 2025-06-16 | End: 2025-06-16 | Stop reason: HOSPADM

## 2025-06-16 RX ORDER — CEPHALEXIN 500 MG/1
500 CAPSULE ORAL 4 TIMES DAILY
Qty: 28 CAPSULE | Refills: 0 | Status: SHIPPED | OUTPATIENT
Start: 2025-06-16 | End: 2025-06-23

## 2025-06-16 RX ORDER — KETOROLAC TROMETHAMINE 30 MG/ML
15 INJECTION, SOLUTION INTRAMUSCULAR; INTRAVENOUS ONCE
Status: COMPLETED | OUTPATIENT
Start: 2025-06-16 | End: 2025-06-16

## 2025-06-16 RX ORDER — OXYBUTYNIN CHLORIDE 5 MG/1
5 TABLET ORAL 2 TIMES DAILY
Qty: 14 TABLET | Refills: 0 | Status: SHIPPED | OUTPATIENT
Start: 2025-06-16 | End: 2025-06-20 | Stop reason: HOSPADM

## 2025-06-16 RX ORDER — OXYCODONE HYDROCHLORIDE 5 MG/1
5 TABLET ORAL EVERY 8 HOURS PRN
Qty: 9 TABLET | Refills: 0 | Status: SHIPPED | OUTPATIENT
Start: 2025-06-16 | End: 2025-06-20 | Stop reason: HOSPADM

## 2025-06-16 RX ADMIN — SODIUM CHLORIDE 1000 ML: 0.9 INJECTION, SOLUTION INTRAVENOUS at 16:42

## 2025-06-16 RX ADMIN — KETOROLAC TROMETHAMINE 15 MG: 30 INJECTION, SOLUTION INTRAMUSCULAR; INTRAVENOUS at 15:32

## 2025-06-16 RX ADMIN — CEFTRIAXONE 2 G: 2 INJECTION, SOLUTION INTRAVENOUS at 16:44

## 2025-06-16 RX ADMIN — OXYBUTYNIN CHLORIDE 5 MG: 5 TABLET ORAL at 20:19

## 2025-06-16 RX ADMIN — MORPHINE SULFATE 4 MG: 4 INJECTION, SOLUTION INTRAMUSCULAR; INTRAVENOUS at 19:38

## 2025-06-16 ASSESSMENT — PAIN - FUNCTIONAL ASSESSMENT
PAIN_FUNCTIONAL_ASSESSMENT: 0-10
PAIN_FUNCTIONAL_ASSESSMENT: 0-10

## 2025-06-16 ASSESSMENT — PAIN DESCRIPTION - LOCATION: LOCATION: BACK

## 2025-06-16 ASSESSMENT — PAIN DESCRIPTION - PAIN TYPE: TYPE: ACUTE PAIN

## 2025-06-16 ASSESSMENT — PAIN SCALES - GENERAL
PAINLEVEL_OUTOF10: 9

## 2025-06-16 ASSESSMENT — PAIN DESCRIPTION - ORIENTATION: ORIENTATION: RIGHT

## 2025-06-16 NOTE — ED TRIAGE NOTES
TRIAGE NOTE   I saw the patient as the Clinician in Triage and performed a brief history and physical exam, established acuity, and ordered appropriate tests to develop basic plan of care. Patient will be seen by an [facility], resident and/or physician who will independently evaluate the patient. Please see subsequent provider notes for further details and disposition.     Brief HPI: In brief, Bryan Myers is a 55 y.o. male that presents for worsening flank pain.  He was just seen in our emergency department 2 days ago and a CT showed 2 kidney stones in his pelvis so they were not in the UVJ and this typically would not cause the pain.  Patient indicates that when the pain starts to worsen it usually becomes more complicated.  He endorses chills.  He denies nausea.  He denies chest pain or dyspnea.  Vital signs are stable in triage but is hypertensive which is probably pain related.  He is on antihypertensives and he did take his medication today.. He has a intrathecal pump    Focused Physical exam:   Patient is obese he has a below the knee on the right amputation.  He is endorsing flank pain.    Plan/MDM:   I ordered 50 mg Toradol new CT noncontrast with concern for complications from his existing kidney stones, CBC CMP urinalysis  Please see subsequent provider note for further details and disposition

## 2025-06-16 NOTE — ED PROVIDER NOTES
Chief Complaint   Patient presents with    Flank Pain     History of Present Illness   Bryan Myers   MRN [id number]    55-year-old presents for evaluation of right flank pain radiating to the right groin for the past 2 days.  Pain intermittently and patient describes this as a sharp electrical sensation.  Occasionally feels chills with sweats but no measured fever.  No change in urinary habits.  Occasionally feels constipated but no abdominal pain, nausea, vomiting and continues to move his bowels.  No testicular tenderness or swelling or penile drainage.  Patient reports that he spoke with someone from his urology office and was encouraged to present to the emergency department for assessment.  Patient reports that in addition to morphine intrathecal pump he tried applying topical lidocaine into the right flank prior to arrival with limited effect.    I reviewed most recent urology note from 6/12/2025 when patient was evaluated by Dr. Gonzalez for urolithiasis for follow-up of right kidney stone with right ureteral stent placed on 6/6/2025 by Dr. Sotelo.  Plan for surgery to remove remaining stone fragments on 7/18/2025.    Chronic medical conditions documented including right below-knee amputation, chronic pain with implantable intrathecal infusion pump, morbid obesity, nephrotic syndrome, adrenocortical insufficiency.    Physical Exam   T 36 °C (96.8 °F)  HR 94  BP (!) 161/114  RR 18  O2 95 % None (Room air)    General: Appears uncomfortable but nontoxic.  Head: Atraumatic.  Eyes: No conjunctival injection.   Ears Nose Throat: No epistaxis. Oral mucosa moist.  Neck: Supple.  Cardiovascular: Extremities warm.   Pulmonary: No stridor. Normal respiratory effort.  Abdominal: No distention.  Musculoskeletal: No deformity or joint swelling.  Skin: No rash.  Psychiatric: Does not appear to respond to internal stimuli.  Neurologic: Alert. Follows directions. Face symmetric. No aphasia or dysarthria. Symmetric movement  of upper and lower extremities.    ED Course & Medical Decision Making   Patient was initially evaluated by provider in triage who ordered labs, urine, CT of the abdomen and pelvis without IV contrast, ceftriaxone, Toradol, and intravenous fluids.  Labs notable for leukocytosis, stable renal insufficiency, normal lactate, and urine with greater than 20 RBC but only 1-5 WBC and small leukocyte esterase with negative nitrate.  CT demonstrated interval placement of right double-J stent with small nonobstructing right renal calculi and no hydronephrosis.  After oxybutynin and morphine patient reported improvement in pain.  Given recent stent placement I discussed with surgical TRISHA on call for urology who recommended that I discussed directly with urologist Dr. Sotelo however he was not on call and I was able to discuss patient's case with urologist Dr. Gonzalez, who evaluated patient most recently in clinic a few days ago.  Assessed that patient most likely has stent related pain.  No measured fever however in setting of subjective chills and new leukocytosis, plan for Keflex prescription while awaiting urine culture.  Also given short course of oxycodone for severe breakthrough pain and oxybutynin.  Patient comfortable with plan for returning home and follow-up with urologist.    Diagnoses as of 06/16/25 2041   Right flank pain   Calculus of kidney            Dennis Grace MD  06/16/25 2041

## 2025-06-17 ENCOUNTER — TELEPHONE (OUTPATIENT)
Dept: UROLOGY | Facility: HOSPITAL | Age: 55
End: 2025-06-17
Payer: COMMERCIAL

## 2025-06-17 ENCOUNTER — DOCUMENTATION (OUTPATIENT)
Age: 55
End: 2025-06-17
Payer: COMMERCIAL

## 2025-06-17 NOTE — TELEPHONE ENCOUNTER
M for patient to call back to discuss moving surgery date sooner.    Joanne Glass RN      Patient returned call, stated he has an existing appointment on Friday and isn't familiar with [facility] so he wishes to keep his procedure as scheduled and if any [place] cancellations come up, he would like a sooner date.    Joanne Glass RN

## 2025-06-17 NOTE — DISCHARGE INSTRUCTIONS
Please take medication as described and follow-up with your urologist.  Return to emergency department if new or worsening symptoms especially severe pain, fever, inability to urinate, vomiting.

## 2025-06-18 ENCOUNTER — APPOINTMENT (OUTPATIENT)
Dept: PAIN MEDICINE | Facility: CLINIC | Age: 55
End: 2025-06-18
Payer: COMMERCIAL

## 2025-06-18 ENCOUNTER — PREP FOR PROCEDURE (OUTPATIENT)
Dept: UROLOGY | Facility: CLINIC | Age: 55
End: 2025-06-18
Payer: COMMERCIAL

## 2025-06-18 DIAGNOSIS — N20.0 KIDNEY STONE: Primary | ICD-10-CM

## 2025-06-18 LAB — BACTERIA UR CULT: NO GROWTH

## 2025-06-18 RX ORDER — CEFAZOLIN SODIUM 2 G/50ML
2 SOLUTION INTRAVENOUS ONCE
Status: CANCELLED | OUTPATIENT
Start: 2025-06-20 | End: 2025-06-18

## 2025-06-18 NOTE — H&P (VIEW-ONLY)
History Of Present Illness  Bryan Myers is a 55 y.o. male presenting with  urolithiasis. The patient was treated for a 1 cm lower pole renal stone in December 2024. At that time, he presented with a similar clinical picture, and his symptoms resolved after treatment of the stone. Ureteral stent placement was required due to a narrow ureter. A current CT scan shows an 8 mm lower pole stone and some small fragments in the upper pole calyx. He was evaluated 06/04/25 and a stent was indicated. It was placed 06/06/25. At the beginning he was doing fine but afterwards he has been in pain having to attend the ED because of it. He has a surgery scheduled with Dr. Gonzalez for 07/18/25 but due to his pain I was asked to perform the surgery before. We have been in contact with him by [employer]iz and he agrees to do the surgery with me.     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    Surgical History  He has a past surgical history that includes Other surgical history (05/25/2021) and Leg Surgery (1996).     Social History  He reports that he has never smoked. He has never used smokeless tobacco. He reports current drug use. Frequency: 1.00 time per week. Drug: Marijuana. He reports that he does not drink alcohol.    Family History  Family History[1]     Allergies  Bee venom protein (honey bee), Adhesive tape-silicones, Haloperidol, and Adhesive     Last Recorded Vitals  There were no vitals taken for this visit.    Relevant Results    No results found for this or any  previous visit (from the past 24 hours).  Imaging  CT abdomen pelvis wo IV contrast  Result Date: 6/16/2025  Interval placement of a right double-J stent. Small non-obstructing right renal calculi.   MACRO: none   Signed by: Desire Meraz 6/16/2025 4:03 PM Dictation workstation:   [id number]      Cardiology, Vascular, and Other Imaging  No other imaging results found for the past 7 days    NCCT (6/16/: There is a double-J stent on the right. There is no hydronephrosis.  There are no)n-obstructing right renal calculi. The largest measures 7  mm.     Assessment/Plan       Benefits and risks of the surgery were already discussed with Dr. Gonzalez. We haven't met yet, but he garees by [employer] message to perform the surgery with me. Once he is admitted to the hospital we will review the procedure, benefits, risks and questions will be answer.         Christopher Castaneda MD         [1]   Family History  Problem Relation Name Age of Onset    Thyroid disease Mother      Diabetes Brother      Cancer Maternal Grandfather      Diabetes Paternal Grandmother      Cancer Paternal Grandfather

## 2025-06-18 NOTE — H&P
History Of Present Illness  Bryan Myers is a 55 y.o. male presenting with  urolithiasis. The patient was treated for a 1 cm lower pole renal stone in December 2024. At that time, he presented with a similar clinical picture, and his symptoms resolved after treatment of the stone. Ureteral stent placement was required due to a narrow ureter. A current CT scan shows an 8 mm lower pole stone and some small fragments in the upper pole calyx. He was evaluated 06/04/25 and a stent was indicated. It was placed 06/06/25. At the beginning he was doing fine but afterwards he has been in pain having to attend the ED because of it. He has a surgery scheduled with Dr. Gonzalez for 07/18/25 but due to his pain I was asked to perform the surgery before. We have been in contact with him by [employer] and he agrees to do the surgery with me.     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    Surgical History  He has a past surgical history that includes Other surgical history (05/25/2021) and Leg Surgery (1996).     Social History  He reports that he has never smoked. He has never used smokeless tobacco. He reports current drug use. Frequency: 1.00 time per week. Drug: Marijuana. He reports that he does not drink alcohol.    Family History  Family History[1]     Allergies  Bee venom protein (honey bee), Adhesive tape-silicones, Haloperidol, and Adhesive     Last Recorded Vitals  There were no vitals taken for this visit.    Relevant Results    No results found for this or any  previous visit (from the past 24 hours).  Imaging  CT abdomen pelvis wo IV contrast  Result Date: 6/16/2025  Interval placement of a right double-J stent. Small non-obstructing right renal calculi.   MACRO: none   Signed by: Desire Meraz 6/16/2025 4:03 PM Dictation workstation:   [id number]      Cardiology, Vascular, and Other Imaging  No other imaging results found for the past 7 days    NCCT (6/16/: There is a double-J stent on the right. There is no hydronephrosis.  There are no)n-obstructing right renal calculi. The largest measures 7  mm.     Assessment/Plan       Benefits and risks of the surgery were already discussed with Dr. Gonzalez. We haven't met yet, but he garees by [employer] message to perform the surgery with me. Once he is admitted to the hospital we will review the procedure, benefits, risks and questions will be answer.         Christopher Castaneda MD         [1]   Family History  Problem Relation Name Age of Onset    Thyroid disease Mother      Diabetes Brother      Cancer Maternal Grandfather      Diabetes Paternal Grandmother      Cancer Paternal Grandfather

## 2025-06-19 RX ORDER — ERGOCALCIFEROL 1.25 MG/1
1.25 CAPSULE ORAL WEEKLY
COMMUNITY

## 2025-06-19 RX ORDER — OMEPRAZOLE 40 MG/1
40 CAPSULE, DELAYED RELEASE ORAL
COMMUNITY

## 2025-06-19 NOTE — PREPROCEDURE INSTRUCTIONS
Current Medications    Medication Instructions    acetaminophen 650 mg/20.3 mL solution oral liquid Take as prescribed    albuterol 2.5 mg /3 mL (0.083 %) nebulizer solution Take as prescribed    ALBUTEROL INHL Take as prescribed    amitriptyline (Elavil) 25 mg tablet Take as prescribed    budesonide-formoteroL (Symbicort) 160-4.5 mcg/actuation inhaler Take as prescribed    cephalexin (Keflex) 500 mg capsule Take as prescribed    diazePAM (Valium) 10 mg tablet Take as prescribed    diclofenac sodium (Voltaren) 1 % gel gel Hold day of surgery    ergocalciferol (Vitamin D-2) 1250 mcg (50,000 units) capsule Hold day of surgery    Farxiga 10 mg Hold day of surgery    fluticasone (Flonase) 50 mcg/actuation nasal spray Take as prescribed    fluticasone propion-salmeteroL (Advair) 230-21 mcg/actuation inhaler Take as prescribed    furosemide (Lasix) 20 mg tablet Take as prescribed    ipratropium (Atrovent) 21 mcg (0.03 %) nasal spray Take as prescribed    levothyroxine (Synthroid, Levoxyl) 100 mcg tablet Take as prescribed    lidocaine (Lidoderm) 5 % patch Hold day of surgery    lidocaine-prilocaine (Emla) 2.5-2.5 % cream Hold day of surgery    loratadine (Claritin) 10 mg tablet Hold day of surgery    losartan (Cozaar) 100 mg tablet Take day of surgery    montelukast (Singulair) 10 mg tablet Hold day of surgery    multivitamin with minerals tablet stop    omega-3 acid ethyl esters (Lovaza) 1 gram capsule stop    omeprazole (PriLOSEC) 40 mg DR capsule Take day of surgery    oxyCODONE (Roxicodone) 5 mg immediate release tablet Take as prescribed    predniSONE (Deltasone) 5 mg tablet Take as prescribed    tamsulosin (Flomax) 0.4 mg 24 hr capsule Take as prescribed    testosterone cypionate (Depo-Testosterone) 200 mg/mL injection Take as prescribed                       NPO Instructions:    Do not eat any food after midnight the night before your surgery/procedure.    Additional Instructions:     Day of Surgery:  You may have  clear liquids until TWO hours before surgery/procedure.  This includes water, black tea/coffee, (no milk or cream) apple juice and electrolyte drinks (Gatorade)  Wear  comfortable loose fitting clothing  Do not use moisturizers, creams, lotions or perfume  All jewelry and valuables should be left at home      PRE-OPERATIVE INSTRUCTIONS FOR SURGERY    *Do not eat anything after midnight the night of surgery.  This includes food of any kind (including hard candy, cough drops, mints).     You may have up to 13 ounces of clear liquid until TWO hours prior to your scheduled arrival time  Clear liquids include water, black tea/coffee, (no milk or cream) apple juice and electrolyte drinks (GATORADE).  You may chew gum until TWO hours prior you your surgery/procedure    *One of our staff members will call you ONE business day before your surgery, between 11am-2 pm to let you know the time to arrive.  If you have not received a call by 2 pm, call [phone]    *When you arrive at the hospital-->GO TO Registration on the ground floor  *Stop smoking 24 hours prior to surgery.  No Marijuana, CBD Oil or Vaping for 48 hours  *No alcohol 24 hours prior to surgery  *You will need a responsible adult to drive you home  -No acrylic nails or nail polish on at least one fingernail, NO polish on toes for foot surgery  -You may be asked to remove your dentures, partial plate, eyeglasses or contact lenses before going to surgery.  Please bring a case for these items.  -Body piercings need to be removed.  Jewelry and valuables should be left at home.  -Put on loose,  comfortable, clean clothing, that will accommodate bandages    *If you have any further questions about your pre-op instructions,  not mentioned in this handout, then call [phone]*    Arrival 1130 am for 1300 surgery we will call you if times change

## 2025-06-20 ENCOUNTER — PHARMACY VISIT (OUTPATIENT)
Dept: PHARMACY | Facility: CLINIC | Age: 55
End: 2025-06-20
Payer: COMMERCIAL

## 2025-06-20 ENCOUNTER — ANESTHESIA (OUTPATIENT)
Dept: OPERATING ROOM | Facility: HOSPITAL | Age: 55
End: 2025-06-20
Payer: COMMERCIAL

## 2025-06-20 ENCOUNTER — ANESTHESIA EVENT (OUTPATIENT)
Dept: OPERATING ROOM | Facility: HOSPITAL | Age: 55
End: 2025-06-20
Payer: COMMERCIAL

## 2025-06-20 ENCOUNTER — HOSPITAL ENCOUNTER (OUTPATIENT)
Facility: HOSPITAL | Age: 55
Setting detail: OUTPATIENT SURGERY
Discharge: HOME | End: 2025-06-20
Payer: COMMERCIAL

## 2025-06-20 ENCOUNTER — APPOINTMENT (OUTPATIENT)
Dept: RADIOLOGY | Facility: HOSPITAL | Age: 55
End: 2025-06-20
Payer: COMMERCIAL

## 2025-06-20 ENCOUNTER — APPOINTMENT (OUTPATIENT)
Dept: PAIN MEDICINE | Facility: CLINIC | Age: 55
End: 2025-06-20
Payer: COMMERCIAL

## 2025-06-20 VITALS
DIASTOLIC BLOOD PRESSURE: 79 MMHG | RESPIRATION RATE: 16 BRPM | HEART RATE: 68 BPM | BODY MASS INDEX: 39.18 KG/M2 | HEIGHT: 69 IN | SYSTOLIC BLOOD PRESSURE: 161 MMHG | TEMPERATURE: 96.8 F | OXYGEN SATURATION: 95 % | WEIGHT: 264.55 LBS

## 2025-06-20 DIAGNOSIS — N20.0 KIDNEY STONE: Primary | ICD-10-CM

## 2025-06-20 PROBLEM — G47.33 OSA (OBSTRUCTIVE SLEEP APNEA): Status: ACTIVE | Noted: 2025-06-20

## 2025-06-20 PROBLEM — I10 PRIMARY HYPERTENSION: Status: ACTIVE | Noted: 2025-06-20

## 2025-06-20 LAB
BACTERIA BLD CULT: NORMAL
BACTERIA BLD CULT: NORMAL

## 2025-06-20 PROCEDURE — 2500000004 HC RX 250 GENERAL PHARMACY W/ HCPCS (ALT 636 FOR OP/ED): Performed by: ANESTHESIOLOGIST ASSISTANT

## 2025-06-20 PROCEDURE — 3700000002 HC GENERAL ANESTHESIA TIME - EACH INCREMENTAL 1 MINUTE

## 2025-06-20 PROCEDURE — 3600000004 HC OR TIME - INITIAL BASE CHARGE - PROCEDURE LEVEL FOUR

## 2025-06-20 PROCEDURE — 76000 FLUOROSCOPY <1 HR PHYS/QHP: CPT | Mod: 59

## 2025-06-20 PROCEDURE — 7100000010 HC PHASE TWO TIME - EACH INCREMENTAL 1 MINUTE

## 2025-06-20 PROCEDURE — 3600000009 HC OR TIME - EACH INCREMENTAL 1 MINUTE - PROCEDURE LEVEL FOUR

## 2025-06-20 PROCEDURE — 7100000001 HC RECOVERY ROOM TIME - INITIAL BASE CHARGE

## 2025-06-20 PROCEDURE — C1758 CATHETER, URETERAL: HCPCS

## 2025-06-20 PROCEDURE — 3700000001 HC GENERAL ANESTHESIA TIME - INITIAL BASE CHARGE

## 2025-06-20 PROCEDURE — C1747 HC OR 272 NO HCPCS: HCPCS

## 2025-06-20 PROCEDURE — RXMED WILLOW AMBULATORY MEDICATION CHARGE

## 2025-06-20 PROCEDURE — C1769 GUIDE WIRE: HCPCS

## 2025-06-20 PROCEDURE — 7100000009 HC PHASE TWO TIME - INITIAL BASE CHARGE

## 2025-06-20 PROCEDURE — 7100000002 HC RECOVERY ROOM TIME - EACH INCREMENTAL 1 MINUTE

## 2025-06-20 PROCEDURE — 2500000005 HC RX 250 GENERAL PHARMACY W/O HCPCS

## 2025-06-20 PROCEDURE — 2500000005 HC RX 250 GENERAL PHARMACY W/O HCPCS: Performed by: ANESTHESIOLOGIST ASSISTANT

## 2025-06-20 PROCEDURE — 2720000007 HC OR 272 NO HCPCS

## 2025-06-20 RX ORDER — WATER 1 ML/ML
INJECTION IRRIGATION AS NEEDED
Status: DISCONTINUED | OUTPATIENT
Start: 2025-06-20 | End: 2025-06-20 | Stop reason: HOSPADM

## 2025-06-20 RX ORDER — CEFAZOLIN SODIUM 2 G/50ML
2 SOLUTION INTRAVENOUS ONCE
Status: DISCONTINUED | OUTPATIENT
Start: 2025-06-20 | End: 2025-06-20 | Stop reason: HOSPADM

## 2025-06-20 RX ORDER — DIPHENHYDRAMINE HYDROCHLORIDE 50 MG/ML
12.5 INJECTION, SOLUTION INTRAMUSCULAR; INTRAVENOUS ONCE AS NEEDED
Status: DISCONTINUED | OUTPATIENT
Start: 2025-06-20 | End: 2025-06-20 | Stop reason: HOSPADM

## 2025-06-20 RX ORDER — ONDANSETRON HYDROCHLORIDE 2 MG/ML
4 INJECTION, SOLUTION INTRAVENOUS ONCE AS NEEDED
Status: DISCONTINUED | OUTPATIENT
Start: 2025-06-20 | End: 2025-06-20 | Stop reason: HOSPADM

## 2025-06-20 RX ORDER — ROCURONIUM BROMIDE 10 MG/ML
INJECTION, SOLUTION INTRAVENOUS AS NEEDED
Status: DISCONTINUED | OUTPATIENT
Start: 2025-06-20 | End: 2025-06-20

## 2025-06-20 RX ORDER — SODIUM CHLORIDE 0.9 G/100ML
INJECTION, SOLUTION IRRIGATION AS NEEDED
Status: DISCONTINUED | OUTPATIENT
Start: 2025-06-20 | End: 2025-06-20 | Stop reason: HOSPADM

## 2025-06-20 RX ORDER — MIDAZOLAM HYDROCHLORIDE 1 MG/ML
INJECTION, SOLUTION INTRAMUSCULAR; INTRAVENOUS AS NEEDED
Status: DISCONTINUED | OUTPATIENT
Start: 2025-06-20 | End: 2025-06-20

## 2025-06-20 RX ORDER — ACETAMINOPHEN 325 MG/1
650 TABLET ORAL EVERY 4 HOURS PRN
Status: DISCONTINUED | OUTPATIENT
Start: 2025-06-20 | End: 2025-06-20 | Stop reason: HOSPADM

## 2025-06-20 RX ORDER — LABETALOL HYDROCHLORIDE 5 MG/ML
10 INJECTION, SOLUTION INTRAVENOUS ONCE AS NEEDED
Status: DISCONTINUED | OUTPATIENT
Start: 2025-06-20 | End: 2025-06-20 | Stop reason: HOSPADM

## 2025-06-20 RX ORDER — SUCCINYLCHOLINE CHLORIDE 20 MG/ML
INJECTION, SOLUTION INTRAMUSCULAR; INTRAVENOUS AS NEEDED
Status: DISCONTINUED | OUTPATIENT
Start: 2025-06-20 | End: 2025-06-20

## 2025-06-20 RX ORDER — ONDANSETRON HYDROCHLORIDE 2 MG/ML
INJECTION, SOLUTION INTRAVENOUS AS NEEDED
Status: DISCONTINUED | OUTPATIENT
Start: 2025-06-20 | End: 2025-06-20

## 2025-06-20 RX ORDER — KETOROLAC TROMETHAMINE 30 MG/ML
INJECTION, SOLUTION INTRAMUSCULAR; INTRAVENOUS AS NEEDED
Status: DISCONTINUED | OUTPATIENT
Start: 2025-06-20 | End: 2025-06-20

## 2025-06-20 RX ORDER — PROPOFOL 10 MG/ML
INJECTION, EMULSION INTRAVENOUS AS NEEDED
Status: DISCONTINUED | OUTPATIENT
Start: 2025-06-20 | End: 2025-06-20

## 2025-06-20 RX ORDER — KETOROLAC TROMETHAMINE 10 MG/1
10 TABLET, FILM COATED ORAL EVERY 8 HOURS PRN
Qty: 15 TABLET | Refills: 0 | Status: SHIPPED | OUTPATIENT
Start: 2025-06-20

## 2025-06-20 RX ORDER — PHENYLEPHRINE HCL IN 0.9% NACL 1 MG/10 ML
SYRINGE (ML) INTRAVENOUS AS NEEDED
Status: DISCONTINUED | OUTPATIENT
Start: 2025-06-20 | End: 2025-06-20

## 2025-06-20 RX ORDER — HYDROMORPHONE HYDROCHLORIDE 1 MG/ML
1 INJECTION, SOLUTION INTRAMUSCULAR; INTRAVENOUS; SUBCUTANEOUS EVERY 5 MIN PRN
Status: DISCONTINUED | OUTPATIENT
Start: 2025-06-20 | End: 2025-06-20 | Stop reason: HOSPADM

## 2025-06-20 RX ORDER — LIDOCAINE HYDROCHLORIDE 20 MG/ML
INJECTION, SOLUTION INFILTRATION; PERINEURAL AS NEEDED
Status: DISCONTINUED | OUTPATIENT
Start: 2025-06-20 | End: 2025-06-20

## 2025-06-20 RX ORDER — CEFAZOLIN 1 G/1
INJECTION, POWDER, FOR SOLUTION INTRAVENOUS AS NEEDED
Status: DISCONTINUED | OUTPATIENT
Start: 2025-06-20 | End: 2025-06-20

## 2025-06-20 RX ORDER — NORETHINDRONE AND ETHINYL ESTRADIOL 0.5-0.035
KIT ORAL AS NEEDED
Status: DISCONTINUED | OUTPATIENT
Start: 2025-06-20 | End: 2025-06-20

## 2025-06-20 RX ORDER — MEPERIDINE HYDROCHLORIDE 50 MG/ML
12.5 INJECTION INTRAMUSCULAR; INTRAVENOUS; SUBCUTANEOUS EVERY 10 MIN PRN
Status: DISCONTINUED | OUTPATIENT
Start: 2025-06-20 | End: 2025-06-20 | Stop reason: HOSPADM

## 2025-06-20 RX ORDER — SODIUM CHLORIDE, SODIUM LACTATE, POTASSIUM CHLORIDE, CALCIUM CHLORIDE 600; 310; 30; 20 MG/100ML; MG/100ML; MG/100ML; MG/100ML
100 INJECTION, SOLUTION INTRAVENOUS CONTINUOUS
Status: DISCONTINUED | OUTPATIENT
Start: 2025-06-20 | End: 2025-06-20 | Stop reason: HOSPADM

## 2025-06-20 RX ORDER — HYDRALAZINE HYDROCHLORIDE 20 MG/ML
10 INJECTION INTRAMUSCULAR; INTRAVENOUS EVERY 30 MIN PRN
Status: DISCONTINUED | OUTPATIENT
Start: 2025-06-20 | End: 2025-06-20 | Stop reason: HOSPADM

## 2025-06-20 RX ORDER — FENTANYL CITRATE 50 UG/ML
INJECTION, SOLUTION INTRAMUSCULAR; INTRAVENOUS AS NEEDED
Status: DISCONTINUED | OUTPATIENT
Start: 2025-06-20 | End: 2025-06-20

## 2025-06-20 RX ORDER — KETOROLAC TROMETHAMINE 10 MG/1
10 TABLET, FILM COATED ORAL EVERY 8 HOURS PRN
Qty: 15 TABLET | Refills: 0 | Status: SHIPPED | OUTPATIENT
Start: 2025-06-20 | End: 2025-06-20

## 2025-06-20 RX ADMIN — MIDAZOLAM 2 MG: 1 INJECTION INTRAMUSCULAR; INTRAVENOUS at 13:27

## 2025-06-20 RX ADMIN — Medication 200 MCG: at 13:52

## 2025-06-20 RX ADMIN — EPHEDRINE SULFATE 10 MG: 50 INJECTION, SOLUTION INTRAVENOUS at 14:04

## 2025-06-20 RX ADMIN — SODIUM CHLORIDE, SODIUM LACTATE, POTASSIUM CHLORIDE, AND CALCIUM CHLORIDE: .6; .31; .03; .02 INJECTION, SOLUTION INTRAVENOUS at 13:28

## 2025-06-20 RX ADMIN — Medication 140 MG: at 13:30

## 2025-06-20 RX ADMIN — LIDOCAINE HYDROCHLORIDE 40 MG: 20 INJECTION, SOLUTION INFILTRATION; PERINEURAL at 13:29

## 2025-06-20 RX ADMIN — PROPOFOL 200 MG: 10 INJECTION, EMULSION INTRAVENOUS at 13:30

## 2025-06-20 RX ADMIN — FENTANYL CITRATE 100 MCG: 50 INJECTION, SOLUTION INTRAMUSCULAR; INTRAVENOUS at 13:29

## 2025-06-20 RX ADMIN — CEFAZOLIN 2 G: 330 INJECTION, POWDER, FOR SOLUTION INTRAMUSCULAR; INTRAVENOUS at 13:36

## 2025-06-20 RX ADMIN — EPHEDRINE SULFATE 10 MG: 50 INJECTION, SOLUTION INTRAVENOUS at 14:01

## 2025-06-20 RX ADMIN — ONDANSETRON 4 MG: 2 INJECTION INTRAMUSCULAR; INTRAVENOUS at 14:13

## 2025-06-20 RX ADMIN — Medication 100 MCG: at 13:50

## 2025-06-20 RX ADMIN — ROCURONIUM BROMIDE 10 MG: 10 INJECTION, SOLUTION INTRAVENOUS at 13:30

## 2025-06-20 RX ADMIN — Medication 300 MCG: at 14:02

## 2025-06-20 RX ADMIN — KETOROLAC TROMETHAMINE 30 MG: 30 INJECTION, SOLUTION INTRAMUSCULAR at 14:13

## 2025-06-20 RX ADMIN — DEXAMETHASONE SODIUM PHOSPHATE 4 MG: 4 INJECTION, SOLUTION INTRAMUSCULAR; INTRAVENOUS at 13:36

## 2025-06-20 SDOH — HEALTH STABILITY: MENTAL HEALTH: CURRENT SMOKER: 0

## 2025-06-20 ASSESSMENT — COLUMBIA-SUICIDE SEVERITY RATING SCALE - C-SSRS
6. HAVE YOU EVER DONE ANYTHING, STARTED TO DO ANYTHING, OR PREPARED TO DO ANYTHING TO END YOUR LIFE?: NO
1. IN THE PAST MONTH, HAVE YOU WISHED YOU WERE DEAD OR WISHED YOU COULD GO TO SLEEP AND NOT WAKE UP?: NO
2. HAVE YOU ACTUALLY HAD ANY THOUGHTS OF KILLING YOURSELF?: NO

## 2025-06-20 ASSESSMENT — PAIN SCALES - GENERAL
PAINLEVEL_OUTOF10: 8
PAIN_LEVEL: 0
PAINLEVEL_OUTOF10: 0 - NO PAIN

## 2025-06-20 ASSESSMENT — PAIN - FUNCTIONAL ASSESSMENT
PAIN_FUNCTIONAL_ASSESSMENT: 0-10
PAIN_FUNCTIONAL_ASSESSMENT: 0-10

## 2025-06-20 ASSESSMENT — PAIN DESCRIPTION - DESCRIPTORS: DESCRIPTORS: ACHING

## 2025-06-20 NOTE — ANESTHESIA PROCEDURE NOTES
Airway  Date/Time: 6/20/2025 1:34 PM  Reason: elective    Airway not difficult    Staffing  Performed: MEI   Authorized by: Armen Smyth MD    Performed by: Selam Castaneda  Patient location during procedure: OR    Patient Condition  Indications for airway management: anesthesia  Patient position: sniffing  MILS maintained throughout  Sedation level: deep     Final Airway Details  Final airway type: endotracheal airway  Successful airway: ETT  Cuffed: yes   Successful intubation technique: video laryngoscopy (pratt)  Adjuncts used in placement: cricoid pressure and intubating stylet  Endotracheal tube insertion site: oral  Blade: Bob  Blade size: #4  ETT size (mm): 7.5  Cormack-Lehane Classification: grade I - full view of glottis  Placement verified by: capnometry   Measured from: lips  ETT to lips (cm): 24

## 2025-06-20 NOTE — ANESTHESIA POSTPROCEDURE EVALUATION
Patient: Bryan Myers    Procedure Summary       Date: 06/20/25 Room / Location: [facility] 02 / [facility]    Anesthesia Start: 1326 Anesthesia Stop: 1427    Procedure: CYSTOSCOPY/ RIGHT URETERAL LASER LITHOTRIPSY WITH C-ARM (Right) Diagnosis:       Kidney stone      (Kidney stone [N20.0])    Surgeons: Christopher Castaneda MD Responsible Provider: Armen Smyth MD    Anesthesia Type: general ASA Status: 3            Anesthesia Type: general    Vitals Value Taken Time   /82 06/20/25 14:26   Temp 36.0 06/20/25 14:27   Pulse 71 06/20/25 14:26   Resp 16 06/20/25 14:27   SpO2 99 % 06/20/25 14:26   Vitals shown include unfiled device data.    Anesthesia Post Evaluation    Patient location during evaluation: PACU  Patient participation: complete - patient participated  Level of consciousness: sedated and awake  Pain score: 0  Pain management: adequate  Airway patency: patent  Cardiovascular status: acceptable and hemodynamically stable  Respiratory status: acceptable and face mask  Hydration status: acceptable  Postoperative Nausea and Vomiting: none        There were no known notable events for this encounter.

## 2025-06-20 NOTE — ANESTHESIA PREPROCEDURE EVALUATION
Patient: Bryan Myers    Procedure Information       Date/Time: 25 1300    Procedure: CYSTOSCOPY/ RIGHT URETERAL LASER LITHOTRIPSY WITH C-ARM (Right)    Location: [facility] / [facility]    Surgeons: Christopher Castaneda MD            Relevant Problems   Anesthesia (within normal limits)      Cardiac   (+) Primary hypertension      Pulmonary   (+) NEY (obstructive sleep apnea)      /Renal   (+) Calculus of kidney   (+) Kidney stone       Clinical information reviewed:   Tobacco  Allergies  Meds   Med Hx  Surg Hx   Fam Hx          NPO Detail:  NPO/Void Status  Date of Last Liquid: 25  Time of Last Liquid: 1000  Date of Last Solid: 25  Time of Last Solid: 2330         Physical Exam    Airway  Mallampati: III  TM distance: >3 FB  Neck ROM: full  Mouth openin finger widths     Cardiovascular - normal exam  Rhythm: regular  Rate: normal     Dental     (+) upper dentures, lower dentures     Pulmonary - normal examBreath sounds clear to auscultation     Abdominal            Anesthesia Plan    History of general anesthesia?: yes  History of complications of general anesthesia?: no    ASA 3     general     The patient is not a current smoker.  Education provided regarding risk of obstructive sleep apnea.  intravenous induction   Postoperative pain plan includes opioids.  Trial extubation is planned.  Anesthetic plan and risks discussed with patient.    Plan discussed with CAA.

## 2025-06-20 NOTE — OP NOTE
CYSTOSCOPY/ RIGHT URETERAL LASER LITHOTRIPSY WITH C-ARM (R) Operative Note     Date: 2025  OR Location: [facility]    Name: Bryan Myers, : 1970, Age: 55 y.o., MRN: [id number], Sex: male    Diagnosis  Pre-op Diagnosis      * Kidney stone [N20.0] Post-op Diagnosis     * Kidney stone [N20.0]     Procedures  CYSTOSCOPY/ RIGHT URETERAL LASER LITHOTRIPSY WITH C-ARM  70235 - [place] CYSTO FRAGMENTATION URETERAL STONE      Surgeons      * Christopher Castaneda - Primary    Resident/Fellow/Other Assistant:  Surgeons and Role:  * No surgeons found with a matching role *    Staff:   Circulator: Ana Infante Person: Dell  Surgical Assistant: Adelaide    Anesthesia Staff: Anesthesiologist: MD SYDNEE Padilla-AA: ONDINA Martin  MEI: Selam Castaneda    Procedure Summary  Anesthesia: General  ASA: III  Estimated Blood Loss: 0 mL  Intra-op Medications:   Administrations occurring from 1300 to 1430 on 25:   Medication Name Total Dose   sodium chloride 0.9 % irrigation solution 3,000 mL   sterile water irrigation solution 1,000 mL   ceFAZolin (Ancef) vial 1 g 2 g   dexAMETHasone (Decadron) 4 mg/mL IV Syringe 2 mL 4 mg   ePHEDrine injection 20 mg   fentaNYL (Sublimaze) injection 50 mcg/mL 100 mcg   ketorolac (Toradol) injection 30 mg 30 mg   LR bolus Cannot be calculated   lidocaine (Xylocaine) injection 2 % 40 mg   midazolam (Versed) injection 1 mg/mL 2 mg   ondansetron (Zofran) 2 mg/mL injection 4 mg   phenylephrine 100 mcg/mL syringe 10 mL (prefilled) 600 mcg   propofol (Diprivan) injection 10 mg/mL 200 mg   rocuronium (ZeMuron) 50 mg/5 mL injection 10 mg   succinylcholine PF in sodium chloride IV syringe 140 mg              Anesthesia Record               Intraprocedure I/O Totals       None           Specimen: No specimens collected              Drains and/or Catheters: * None in log *    Tourniquet Times:         Implants:     Findings: Right JJ in place. One right kidney stone, impressed to be  CaOxMonohyd.    Indications: Bryan Myers is an 55 y.o. male who is having surgery for Kidney stone [N20.0].  The patient was treated for a 1 cm lower pole renal stone in December 2024. At that time, he presented with a similar clinical picture, and his symptoms resolved after treatment of the stone. Ureteral stent placement was required due to a narrow ureter. A current CT scan shows an 8 mm lower pole stone and some small fragments in the upper pole calyx. He was evaluated 06/04/25 and a stent was indicated. It was placed 06/06/25. At the beginning he was doing fine but afterwards he has been in pain having to attend the ED because of it. He has a surgery scheduled with Dr. Gonzalez for 07/18/25 but due to his pain I was asked to perform the surgery before. We have been in contact with him by [employer] and he agrees to do the surgery with me.     The patient was seen in the preoperative area. The risks, benefits, complications, treatment options, non-operative alternatives, expected recovery and outcomes were discussed with the patient. The possibilities of reaction to medication, pulmonary aspiration, injury to surrounding structures, bleeding, recurrent infection, the need for additional procedures, failure to diagnose a condition, and creating a complication requiring transfusion or operation were discussed with the patient. The patient concurred with the proposed plan, giving informed consent.  The site of surgery was properly noted/marked if necessary per policy. The patient has been actively warmed in preoperative area. Preoperative antibiotics have been ordered and given within 1 hours of incision. Venous thrombosis prophylaxis have been ordered including bilateral sequential compression devices    Procedure Details: After informed consent was obtained, the patient was taken to the operating room. After adequate anesthesia administered, she was then placed into dorsal lithotomy position. Patient was  prepped and draped in sterile fashion. A rigid cystoscope was passed trough the urethra. Once inside the bladder the stent was identified, grasped with forceps and retrieved. A guidewire was then passed up into the right ureteral orifice and into the renal pelvis. A second guidewire was introduced using the semirigid ureteroscope and UO engaged and ureteroscopy performed gently. No stone were identififed in the ureter. A 12/14 Fr UAS was placed without forcing over the working guidewire. A flexible disposable 9.5 Fr ureteroscope was used and ureteroscopy performed. In the renal cavity a stone was identified and fragmented/dusted using 0.5-1 J and 12 Hz and a 200 microns fibre. No fragments were retrieved.  Upper tract was carefully inspected. No obvious issues were noted. Scope was retrieved under direct vision.  No stent was placed. Bladder was drained using the same cystoscope.     Evidence of Infection: No   Complications:  None; patient tolerated the procedure well.    Disposition: PACU - hemodynamically stable.  Condition: stable               Additional Details: No.    Attending Attestation: I was present and scrubbed for the entire procedure.    Christopher Castaneda  Phone Number: [phone]

## 2025-06-24 ENCOUNTER — APPOINTMENT (OUTPATIENT)
Dept: ENDOCRINOLOGY | Facility: CLINIC | Age: 55
End: 2025-06-24
Payer: COMMERCIAL

## 2025-06-27 ENCOUNTER — OFFICE VISIT (OUTPATIENT)
Dept: PAIN MEDICINE | Facility: CLINIC | Age: 55
End: 2025-06-27
Payer: COMMERCIAL

## 2025-06-27 VITALS — DIASTOLIC BLOOD PRESSURE: 80 MMHG | SYSTOLIC BLOOD PRESSURE: 144 MMHG | HEART RATE: 83 BPM | OXYGEN SATURATION: 96 %

## 2025-06-27 DIAGNOSIS — M62.830 MUSCLE SPASM OF BACK: ICD-10-CM

## 2025-06-27 DIAGNOSIS — G54.6 PHANTOM LIMB PAIN: ICD-10-CM

## 2025-06-27 DIAGNOSIS — G57.91 MONONEURITIS LEG, RIGHT: ICD-10-CM

## 2025-06-27 DIAGNOSIS — Z89.511 STATUS POST BELOW-KNEE AMPUTATION OF RIGHT LOWER EXTREMITY: Primary | ICD-10-CM

## 2025-06-27 PROCEDURE — 99204 OFFICE O/P NEW MOD 45 MIN: CPT | Performed by: STUDENT IN AN ORGANIZED HEALTH CARE EDUCATION/TRAINING PROGRAM

## 2025-06-27 PROCEDURE — 3079F DIAST BP 80-89 MM HG: CPT | Performed by: STUDENT IN AN ORGANIZED HEALTH CARE EDUCATION/TRAINING PROGRAM

## 2025-06-27 PROCEDURE — 1036F TOBACCO NON-USER: CPT | Performed by: STUDENT IN AN ORGANIZED HEALTH CARE EDUCATION/TRAINING PROGRAM

## 2025-06-27 PROCEDURE — 3077F SYST BP >= 140 MM HG: CPT | Performed by: STUDENT IN AN ORGANIZED HEALTH CARE EDUCATION/TRAINING PROGRAM

## 2025-06-27 PROCEDURE — 99214 OFFICE O/P EST MOD 30 MIN: CPT | Performed by: STUDENT IN AN ORGANIZED HEALTH CARE EDUCATION/TRAINING PROGRAM

## 2025-06-27 RX ORDER — TIZANIDINE 4 MG/1
4-8 TABLET ORAL 2 TIMES DAILY PRN
Qty: 60 TABLET | Refills: 1 | Status: SHIPPED | OUTPATIENT
Start: 2025-06-27 | End: 2025-07-27

## 2025-06-27 RX ORDER — PREDNISONE 5 MG/1
5 TABLET ORAL 2 TIMES DAILY
COMMUNITY

## 2025-06-27 ASSESSMENT — ENCOUNTER SYMPTOMS
RESPIRATORY NEGATIVE: 1
DYSURIA: 1
DEPRESSION: 0
EYES NEGATIVE: 1
OCCASIONAL FEELINGS OF UNSTEADINESS: 0
PSYCHIATRIC NEGATIVE: 1
ALLERGIC/IMMUNOLOGIC NEGATIVE: 1
NEUROLOGICAL NEGATIVE: 1
GASTROINTESTINAL NEGATIVE: 1
CARDIOVASCULAR NEGATIVE: 1
ENDOCRINE NEGATIVE: 1
MUSCULOSKELETAL NEGATIVE: 1
CONSTITUTIONAL NEGATIVE: 1
LOSS OF SENSATION IN FEET: 0
HEMATOLOGIC/LYMPHATIC NEGATIVE: 1

## 2025-06-27 ASSESSMENT — PATIENT HEALTH QUESTIONNAIRE - PHQ9
SUM OF ALL RESPONSES TO PHQ9 QUESTIONS 1 AND 2: 0
1. LITTLE INTEREST OR PLEASURE IN DOING THINGS: NOT AT ALL
2. FEELING DOWN, DEPRESSED OR HOPELESS: NOT AT ALL

## 2025-06-27 ASSESSMENT — LIFESTYLE VARIABLES: TOTAL SCORE: 0

## 2025-06-27 ASSESSMENT — PAIN SCALES - GENERAL
PAINLEVEL_OUTOF10: 7
PAINLEVEL_OUTOF10: 7

## 2025-06-27 ASSESSMENT — PAIN - FUNCTIONAL ASSESSMENT: PAIN_FUNCTIONAL_ASSESSMENT: 0-10

## 2025-06-27 ASSESSMENT — PAIN DESCRIPTION - DESCRIPTORS: DESCRIPTORS: BURNING;ACHING;SQUEEZING
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
## 2025-06-27 NOTE — H&P
History Of Present Illness  Bryan Myers is a 55 y.o. male presenting with a history of urolithiasis. The patient was treated for a 1 cm lower pole renal stone in December 2024. At that time, he presented with a similar clinical picture, and his symptoms resolved after treatment of the stone. Ureteral stent placement was required due to a narrow ureter. A current CT scan shows an 8 mm lower pole stone and some small fragments in the upper pole calyx. Urinalysis is within normal limits. He had a stent placed due to stone symptoms recently. Te patient attended the ER multiple times due to pain. fURS was planned to treat the stone definitively,     Past Medical History  Medical History[1]    Surgical History  Surgical History[2]     Social History  He reports that he has never smoked. He has never used smokeless tobacco. He reports current drug use. Frequency: 1.00 time per week. Drug: Marijuana. He reports that he does not drink alcohol.    Family History  Family History[3]     Allergies  Bee venom protein (honey bee), Adhesive tape-silicones, Haloperidol, and Adhesive    Review of Systems   Constitutional: Negative.    HENT: Negative.     Eyes: Negative.    Respiratory: Negative.     Cardiovascular: Negative.    Gastrointestinal: Negative.    Endocrine: Negative.    Genitourinary:  Positive for dysuria.   Musculoskeletal: Negative.    Allergic/Immunologic: Negative.    Neurological: Negative.    Hematological: Negative.    Psychiatric/Behavioral: Negative.          Physical Exam  Constitutional:       Appearance: Normal appearance.   HENT:      Head: Normocephalic and atraumatic.      Nose: Nose normal.      Mouth/Throat:      Mouth: Mucous membranes are moist.   Cardiovascular:      Rate and Rhythm: Normal rate and regular rhythm.   Pulmonary:      Effort: Pulmonary effort is normal.   Abdominal:      General: Abdomen is flat.      Palpations: Abdomen is soft.   Genitourinary:     Penis: Normal.    Skin:      "General: Skin is warm.      Capillary Refill: Capillary refill takes less than 2 seconds.   Neurological:      Mental Status: He is alert.          Last Recorded Vitals  Blood pressure 161/79, pulse 68, temperature 36 °C (96.8 °F), temperature source Temporal, resp. rate 16, height 1.753 m (5' 9\"), weight 120 kg (264 lb 8.8 oz), SpO2 95%.    Relevant Results           Assessment & Plan  Kidney stone    Primary hypertension    NEY (obstructive sleep apnea)    Plan is to perform a fURS and ideally not have to place a stent after the procedure.    I spent 20 minutes in the professional and overall care of this patient.      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    Implantable intrathecal infusion pump present     Morbid (severe) obesity due to excess calories (Multi) 03/05/2021    Obesity, Class III, BMI 40-49.9 (morbid obesity)    Nephrotic syndrome with focal and segmental glomerular lesions 03/05/2021    FSGS (focal segmental glomerulosclerosis) with nephrosis    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

## 2025-07-02 ENCOUNTER — APPOINTMENT (OUTPATIENT)
Dept: UROLOGY | Facility: CLINIC | Age: 55
End: 2025-07-02
Payer: COMMERCIAL

## 2025-07-02 VITALS
BODY MASS INDEX: 41.23 KG/M2 | HEART RATE: 78 BPM | WEIGHT: 288 LBS | TEMPERATURE: 98.1 F | SYSTOLIC BLOOD PRESSURE: 161 MMHG | DIASTOLIC BLOOD PRESSURE: 99 MMHG | HEIGHT: 70 IN

## 2025-07-02 DIAGNOSIS — N20.0 KIDNEY STONE: ICD-10-CM

## 2025-07-02 PROCEDURE — 99024 POSTOP FOLLOW-UP VISIT: CPT

## 2025-07-02 PROCEDURE — 3008F BODY MASS INDEX DOCD: CPT

## 2025-07-02 PROCEDURE — 3080F DIAST BP >= 90 MM HG: CPT

## 2025-07-02 PROCEDURE — 3077F SYST BP >= 140 MM HG: CPT

## 2025-07-02 NOTE — PROGRESS NOTES
Bryan has a fURS 06/20/25. No stent was placed.  He has been feeling fine, no pain.    We discussed the need to have a new image in 3 months to evaluate residual fragments. Also, he agrees on having a metabolic evaluation.    Plan:  -[facility]  -[facility]  -Follow up in 3 months with Dr Gonzalez

## 2025-07-15 ENCOUNTER — OFFICE VISIT (OUTPATIENT)
Dept: PULMONOLOGY | Facility: CLINIC | Age: 55
End: 2025-07-15
Payer: COMMERCIAL

## 2025-07-15 VITALS
DIASTOLIC BLOOD PRESSURE: 86 MMHG | SYSTOLIC BLOOD PRESSURE: 130 MMHG | BODY MASS INDEX: 38.37 KG/M2 | HEART RATE: 72 BPM | OXYGEN SATURATION: 97 % | TEMPERATURE: 97.5 F | RESPIRATION RATE: 18 BRPM | WEIGHT: 268 LBS | HEIGHT: 70 IN

## 2025-07-15 DIAGNOSIS — G47.33 OSA (OBSTRUCTIVE SLEEP APNEA): ICD-10-CM

## 2025-07-15 DIAGNOSIS — J41.0 SIMPLE CHRONIC BRONCHITIS (MULTI): Primary | ICD-10-CM

## 2025-07-15 DIAGNOSIS — R91.8 LUNG NODULES: ICD-10-CM

## 2025-07-15 PROCEDURE — 99205 OFFICE O/P NEW HI 60 MIN: CPT | Performed by: INTERNAL MEDICINE

## 2025-07-15 PROCEDURE — 1036F TOBACCO NON-USER: CPT | Performed by: INTERNAL MEDICINE

## 2025-07-15 PROCEDURE — 3008F BODY MASS INDEX DOCD: CPT | Performed by: INTERNAL MEDICINE

## 2025-07-15 PROCEDURE — 3075F SYST BP GE 130 - 139MM HG: CPT | Performed by: INTERNAL MEDICINE

## 2025-07-15 PROCEDURE — 99212 OFFICE O/P EST SF 10 MIN: CPT | Performed by: INTERNAL MEDICINE

## 2025-07-15 PROCEDURE — 3079F DIAST BP 80-89 MM HG: CPT | Performed by: INTERNAL MEDICINE

## 2025-07-15 RX ORDER — BUDESONIDE, GLYCOPYRROLATE, AND FORMOTEROL FUMARATE 160; 9; 4.8 UG/1; UG/1; UG/1
2 AEROSOL, METERED RESPIRATORY (INHALATION) 2 TIMES DAILY
Qty: 32.1 G | Refills: 3 | Status: SHIPPED | OUTPATIENT
Start: 2025-07-15 | End: 2026-07-15

## 2025-07-15 RX ORDER — ALBUTEROL SULFATE 90 UG/1
2 INHALANT RESPIRATORY (INHALATION) EVERY 4 HOURS PRN
Qty: 54 G | Refills: 3 | Status: SHIPPED | OUTPATIENT
Start: 2025-07-15 | End: 2026-07-15

## 2025-07-15 RX ORDER — ALBUTEROL SULFATE 0.83 MG/ML
2.5 SOLUTION RESPIRATORY (INHALATION) EVERY 4 HOURS PRN
Qty: 450 ML | Refills: 3 | Status: SHIPPED | OUTPATIENT
Start: 2025-07-15

## 2025-07-15 ASSESSMENT — COPD QUESTIONNAIRES
QUESTION7_SLEEPQUALITY: 2
QUESTION1_COUGHFREQUENCY: 2
QUESTION8_ENERGYLEVEL: 3
QUESTION6_LEAVINGHOUSE: 0 - I AM CONFIDENT LEAVING MY HOME DESPITE MY LUNG CONDITION
QUESTION5_HOMEACTIVITIES: 2
QUESTION4_WALKINCLINE: 2
QUESTION2_CHESTPHLEGM: 3
QUESTION3_CHESTTIGHTNESS: 0 - MY CHEST DOES NOT FEEL TIGHT AT ALL
CAT_TOTALSCORE: 14

## 2025-07-15 ASSESSMENT — ASTHMA QUESTIONNAIRES
QUESTION_2 LAST FOUR WEEKS HOW OFTEN HAVE YOU HAD SHORTNESS OF BREATH: 1 OR 2 TIMES PER WEEK
QUESTION_1 LAST FOUR WEEKS HOW MUCH OF THE TIME DID YOUR ASTHMA KEEP YOU FROM GETTING AS MUCH DONE AT WORK, SCHOOL OR AT HOME: A LITTLE OF THE TIME
QUESTION_3 LAST FOUR WEEKS HOW OFTEN DID YOUR ASTHMA SYMPTOMS (WHEEZING, COUGHING, SHORTNESS OF BREATH, CHEST TIGHTNESS OR PAIN) WAKE YOU UP AT NIGHT OR EARLIER THAN USUAL IN THE MORNING: NOT AT ALL
QUESTION_4 LAST FOUR WEEKS HOW OFTEN HAVE YOU USED YOUR RESCUE INHALER OR NEBULIZER MEDICATION (SUCH AS ALBUTEROL): 2 OR 3 TIMES PER WEEK
QUESTION_5 LAST FOUR WEEKS HOW WOULD YOU RATE YOUR ASTHMA CONTROL: SOMEWHAT CONTROLLED
ACT_TOTALSCORE: 19

## 2025-07-15 ASSESSMENT — ENCOUNTER SYMPTOMS
FEVER: 0
SHORTNESS OF BREATH: 1
APNEA: 1
COUGH: 1
FATIGUE: 0
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
## 2025-07-20 ENCOUNTER — HOSPITAL ENCOUNTER (OUTPATIENT)
Dept: RADIOLOGY | Facility: EXTERNAL LOCATION | Age: 55
Discharge: HOME | End: 2025-07-20
Payer: COMMERCIAL

## 2025-07-21 DIAGNOSIS — M62.830 MUSCLE SPASM OF BACK: ICD-10-CM

## 2025-07-21 RX ORDER — TIZANIDINE 4 MG/1
TABLET ORAL
Qty: 60 TABLET | Refills: 1 | OUTPATIENT
Start: 2025-07-21

## 2025-07-21 NOTE — TELEPHONE ENCOUNTER
Rx from June should have 1 refill available at the end of the month    Can fill again at next visit

## 2025-07-22 ENCOUNTER — OFFICE VISIT (OUTPATIENT)
Dept: PAIN MEDICINE | Facility: CLINIC | Age: 55
End: 2025-07-22
Payer: COMMERCIAL

## 2025-07-22 VITALS
BODY MASS INDEX: 38.37 KG/M2 | HEART RATE: 84 BPM | DIASTOLIC BLOOD PRESSURE: 86 MMHG | OXYGEN SATURATION: 95 % | WEIGHT: 268 LBS | SYSTOLIC BLOOD PRESSURE: 148 MMHG | HEIGHT: 70 IN

## 2025-07-22 DIAGNOSIS — G54.6 PHANTOM LIMB PAIN: ICD-10-CM

## 2025-07-22 DIAGNOSIS — Z89.511: ICD-10-CM

## 2025-07-22 DIAGNOSIS — T87.33: Primary | ICD-10-CM

## 2025-07-22 PROCEDURE — 62370 ANL SP INF PMP W/MDREPRG&FIL: CPT | Performed by: PHYSICIAN ASSISTANT

## 2025-07-22 PROCEDURE — 99214 OFFICE O/P EST MOD 30 MIN: CPT | Performed by: PHYSICIAN ASSISTANT

## 2025-07-22 PROCEDURE — 3077F SYST BP >= 140 MM HG: CPT | Performed by: PHYSICIAN ASSISTANT

## 2025-07-22 PROCEDURE — 3008F BODY MASS INDEX DOCD: CPT | Performed by: PHYSICIAN ASSISTANT

## 2025-07-22 PROCEDURE — 3079F DIAST BP 80-89 MM HG: CPT | Performed by: PHYSICIAN ASSISTANT

## 2025-07-22 ASSESSMENT — PAIN SCALES - GENERAL
PAINLEVEL_OUTOF10: 7
PAINLEVEL_OUTOF10: 7

## 2025-07-22 ASSESSMENT — ENCOUNTER SYMPTOMS
DEPRESSION: 0
OCCASIONAL FEELINGS OF UNSTEADINESS: 1
LOSS OF SENSATION IN FEET: 1

## 2025-07-22 ASSESSMENT — PAIN DESCRIPTION - DESCRIPTORS: DESCRIPTORS: ACHING;SHARP;BURNING

## 2025-07-22 ASSESSMENT — PAIN - FUNCTIONAL ASSESSMENT: PAIN_FUNCTIONAL_ASSESSMENT: 0-10
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
## 2025-08-01 ENCOUNTER — APPOINTMENT (OUTPATIENT)
Dept: ORTHOPEDIC SURGERY | Facility: CLINIC | Age: 55
End: 2025-08-01
Payer: COMMERCIAL

## 2025-08-07 ENCOUNTER — APPOINTMENT (OUTPATIENT)
Dept: ENDOCRINOLOGY | Facility: CLINIC | Age: 55
End: 2025-08-07
Payer: COMMERCIAL

## 2025-08-07 DIAGNOSIS — E27.40 HYPOADRENALISM (MULTI): ICD-10-CM

## 2025-08-07 DIAGNOSIS — E03.9 HYPOTHYROIDISM, UNSPECIFIED TYPE: ICD-10-CM

## 2025-08-07 DIAGNOSIS — E29.1 HYPOGONADISM MALE: Primary | ICD-10-CM

## 2025-08-07 DIAGNOSIS — E23.0 CENTRAL HYPOGONADISM (MULTI): ICD-10-CM

## 2025-08-07 PROCEDURE — 99214 OFFICE O/P EST MOD 30 MIN: CPT | Performed by: INTERNAL MEDICINE

## 2025-08-07 ASSESSMENT — ENCOUNTER SYMPTOMS
DEPRESSION: 0
OCCASIONAL FEELINGS OF UNSTEADINESS: 1
LOSS OF SENSATION IN FEET: 0

## 2025-08-07 ASSESSMENT — PATIENT HEALTH QUESTIONNAIRE - PHQ9
1. LITTLE INTEREST OR PLEASURE IN DOING THINGS: NOT AT ALL
2. FEELING DOWN, DEPRESSED OR HOPELESS: NOT AT ALL
SUM OF ALL RESPONSES TO PHQ9 QUESTIONS 1 AND 2: 0

## 2025-08-07 NOTE — PROGRESS NOTES
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    He has been under long-term care for hypopituitarism, hypogonadism, and hypothyroidism. He had bariatric surgery for weight management and has complications from a below-knee amputation and infections around that area for many years. He has also been diagnosed with secondary adrenal insufficiency of unknown etiology. Current blood tests from 06/2025 show testosterone levels at 407, free T4 at 1.3, cortisol at 7, and hemoglobin A1c at 4.8 from 02/2025. He is currently taking levothyroxine 100 mcg (3 tablets due to bariatric surgery absorption issues), prednisone 5 mg, and was previously on testosterone replacements.    Between 10/2024 and 12/2024, he had 5 or 6 ER visits for kidney stones and other issues, including gallbladder removal surgery, 2 kidney stone removal surgeries, and the implantation of an internal pain pump. In 06/2025, he underwent another kidney stone removal surgery. He is unsure if a stone was missed or if he is forming them rapidly. A titrate test is scheduled for 09/2025. He has been trying to improve his diet by avoiding high-fat foods and fried foods. He consumes a lot of tea and has been advised to reduce his intake of high-oxalate foods such as spinach, chocolate, potatoes, peanuts, and almonds. He has an appointment with his original nephrologist in 10/2025.    He has not been on testosterone since the beginning of 06/2025. He is currently taking prednisone 5 mg twice daily.    He has been taking a prostate health supplement containing saw palmetto and other herbs.    PAST SURGICAL HISTORY:  Gallbladder removal surgery, 2 kidney stone removal surgeries, internal pain pump implantation, bariatric  surgery, below-knee amputation.    Diet Recall:  Beverages: He consumes a lot of tea.  Takeout/Dine-in/Fast food: He ordered a grilled chicken sub last night.       Patient is prescribed an ACE/ARB/ARNI medication   Patient is not prescribed a STATIN medication  Patient is prescribed a SGLT2/GLP1 medication  Last Eye Exam: NA     Liver Screening: FIB-4 Calculation: 0.59 at 6/16/2025  3:32 PM  Calculated from:  SGOT/AST: 24 U/L at 6/16/2025  3:32 PM  SGPT/ALT: 28 U/L at 6/16/2025  3:32 PM  Platelets: 417 x10*3/uL at 6/16/2025  3:32 PM  Age: 55 years    Interpretation: <1.45 Cirrhosis less likely, 1.45 - 3.25 Indeterminate, >3.25 Cirrhosis more likely    Any family history of thyroid cancer? no  Any personal history of radiation to your head/neck? no    Past Medical History  He has a past medical history of Acquired absence of right leg below knee (Multi) (03/05/2021), Asthma, COPD (chronic obstructive pulmonary disease) (Multi), Hypertension, Hypopituitarism (Multi) (05/25/2021), Implantable intrathecal infusion pump present, Morbid (severe) obesity due to excess calories (Multi) (03/05/2021), Nephrotic syndrome with focal and segmental glomerular lesions (03/05/2021), Obesity, Other adrenocortical insufficiency (05/25/2021), Pneumonia, Respiratory failure, Sleep apnea, obstructive, and Vitamin D deficiency, unspecified (03/05/2021).    Surgical History  He has a past surgical history that includes Other surgical history (05/25/2021); Leg Surgery (1996); and Kidney stone surgery (Right, 06/2025).     Social History  Social History     Socioeconomic History    Marital status:      Spouse name: Not on file    Number of children: Not on file    Years of education: Not on file    Highest education level: Not on file   Occupational History    Not on file   Tobacco Use    Smoking status: Former     Current packs/day: 3.00     Average packs/day: 3.0 packs/day for 30.0 years (90.0 ttl pk-yrs)     Types: Cigarettes     Smokeless tobacco: Never    Tobacco comments:     Quit November 2007   Vaping Use    Vaping status: Never Used   Substance and Sexual Activity    Alcohol use: Not Currently    Drug use: Not Currently     Frequency: 1.0 times per week     Types: Marijuana     Comment: TOPICAL WITH THC    Sexual activity: Yes     Partners: Female   Other Topics Concern    Not on file   Social History Narrative    Not on file     Social Drivers of Health     Financial Resource Strain: Low Risk  (12/9/2024)    Received from [facility]    Overall Financial Resource Strain (CARDIA)     Difficulty of Paying Living Expenses: Not very hard   Food Insecurity: No Food Insecurity (12/9/2024)    Received from [facility]    Hunger Vital Sign     Within the past 12 months, you worried that your food would run out before you got the money to buy more.: Never true     Within the past 12 months, the food you bought just didn't last and you didn't have money to get more.: Never true   Transportation Needs: No Transportation Needs (12/9/2024)    Received from [facility]    PRAPARE - Transportation     Lack of Transportation (Medical): No     Lack of Transportation (Non-Medical): No   Physical Activity: Inactive (12/9/2024)    Received from [facility]    Exercise Vital Sign     On average, how many days per week do you engage in moderate to strenuous exercise (like a brisk walk)?: 0 days     On average, how many minutes do you engage in exercise at this level?: 0 min   Stress: Stress Concern Present (12/9/2024)    Received from [facility]    [employer] [place] [employer] - Occupational Stress Questionnaire     Feeling of Stress : Rather much   Social Connections: Socially Isolated (12/9/2024)    Received from [facility]    Social Connection and Isolation Panel     In a typical week, how many times do you talk on the phone with family, friends, or neighbors?: Once a week     How often do you get together  "with friends or relatives?: Never     How often do you attend Taoism or Restorationist services?: Never     Do you belong to any clubs or organizations such as Taoism groups, unions, fraternal or athletic groups, or school groups?: No     How often do you attend meetings of the clubs or organizations you belong to?: Never     Are you , , , , never , or living with a partner?:    Intimate Partner Violence: Not on file   Housing Stability: High Risk (12/9/2024)    Received from [facility]    Housing Stability Vital Sign     In the last 12 months, was there a time when you were not able to pay the mortgage or rent on time?: Yes     Number of Times Moved in the Last Year: Not on file     Homeless in the Last Year: Not on file       Family History  Family History[1]     Allergies  Bee venom protein (honey bee), Adhesive tape-silicones, Haloperidol, and Adhesive    Review of Systems       Physical Exam     ROS, PMH, FH/SH, surgical history and allergies have been reviewed.      Last Recorded Vitals  There were no vitals taken for this visit.    Results  Labs:  Testosterone: 407 ng/dL  Free T4: 1.3 ng/dL  Cortisol: 7 mcg/dL  Hemoglobin A1c: 4.8%       Relevant Results      Lab Results   Component Value Date    BILITOT 0.4 06/16/2025    CALCIUM 9.3 06/16/2025    CO2 25 06/16/2025     06/16/2025    CREATININE 1.46 (H) 06/16/2025    GLUCOSE 108 (H) 06/16/2025    ALKPHOS 69 06/16/2025    K 4.0 06/16/2025    PROT 7.7 06/16/2025     06/16/2025    AST 24 06/16/2025    ALT 28 06/16/2025    BUN 20 06/16/2025    ANIONGAP 13 06/16/2025    MG 1.96 04/27/2025    ALBUMIN 4.1 06/16/2025    LIPASE 39 06/03/2025     No results found for: \"TRIG\", \"CHOL\", \"LDLCALC\", \"HDL\"  Lab Results   Component Value Date    HGBA1C 4.8 02/18/2025     The ASCVD Risk score (Martin DK, et al., 2019) failed to calculate for the following reasons:    Cannot find a previous HDL lab    Cannot find a " previous total cholesterol lab    Assessment/Plan   Problem List Items Addressed This Visit    None              Assessment & Plan  1. Hypoadrenalism: Stable.  - History of secondary adrenal insufficiency of unknown etiology.  - Blood tests will be ordered to monitor his condition, including thyroid and testosterone levels.  - Prednisone 5 mg twice a day due to absorption issues from bariatric surgery. Continue current prednisone regimen.    2. Hypogonadism.  - History of hypogonadism.  - Blood tests will be ordered to check his testosterone levels in September 2025. If needed, testosterone replacement therapy will be reconsidered after evaluating the results.    3. Hypothyroidism.  - History of central hypothyroidism.  - Thyroid function tests will be ordered to monitor his condition.  - Levothyroxine 100 mcg, 3 tablets daily due to absorption issues from bariatric surgery.    4. Kidney stones.  - Multiple episodes of kidney stones, including recent surgeries for removal.  - A citrate test is scheduled for September 2025 to further evaluate his condition. Consult with urology for potential medications like potassium citrate to prevent further stone formation.  - Decrease oxalate-rich foods in his diet, such as tea, spinach, chocolate, potatoes, and peanuts.    Follow-up: The patient will follow up in person in March 2026.       I spent a total of 30+ minutes on the date of the service which included preparing to see the patient, face-to-face patient care, completing clinical documentation, obtaining and / or reviewing separately obtained history, counseling and educating the patient/family/caregiver, ordering medications, tests, or procedures, communicating with other healthcare providers (not separately reported), independently interpreting results, not separately reported, and communicating results to the patient/family/caregiver, real-time telemedicine visit using audiovisual technology with patient's verbal  consent.  Name and date of birth verified.      This medical note was created with the assistance of artificial intelligence (AI) for documentation purposes. The content has been reviewed and confirmed by the healthcare provider for accuracy and completeness. Patient consented to the use of audio recording and use of AI during their visit.        Esvin Bills MD         [1]   Family History  Problem Relation Name Age of Onset    Thyroid disease Mother      Diabetes Brother      Cancer Maternal Grandfather      Diabetes Paternal Grandmother      Cancer Paternal Grandfather

## 2025-08-26 ENCOUNTER — APPOINTMENT (OUTPATIENT)
Dept: RADIOLOGY | Facility: CLINIC | Age: 55
End: 2025-08-26
Payer: MEDICARE

## 2025-08-26 ENCOUNTER — HOSPITAL ENCOUNTER (OUTPATIENT)
Dept: RADIOLOGY | Facility: CLINIC | Age: 55
Discharge: HOME | End: 2025-08-26
Payer: MEDICARE

## 2025-08-26 ENCOUNTER — OFFICE VISIT (OUTPATIENT)
Dept: ORTHOPEDIC SURGERY | Facility: CLINIC | Age: 55
End: 2025-08-26
Payer: MEDICARE

## 2025-08-26 DIAGNOSIS — Z89.511 HX OF RIGHT BKA (MULTI): ICD-10-CM

## 2025-08-26 DIAGNOSIS — T81.41XA ABSCESS INVOLVING SUTURE: ICD-10-CM

## 2025-08-26 DIAGNOSIS — Z89.511 HX OF RIGHT BKA (MULTI): Primary | ICD-10-CM

## 2025-08-26 PROCEDURE — 99204 OFFICE O/P NEW MOD 45 MIN: CPT | Performed by: STUDENT IN AN ORGANIZED HEALTH CARE EDUCATION/TRAINING PROGRAM

## 2025-08-26 PROCEDURE — 73552 X-RAY EXAM OF FEMUR 2/>: CPT | Mod: RT

## 2025-08-26 PROCEDURE — 73560 X-RAY EXAM OF KNEE 1 OR 2: CPT | Mod: RT

## 2025-08-26 PROCEDURE — 73552 X-RAY EXAM OF FEMUR 2/>: CPT | Mod: RIGHT SIDE | Performed by: RADIOLOGY

## 2025-08-26 PROCEDURE — 99213 OFFICE O/P EST LOW 20 MIN: CPT

## 2025-08-26 PROCEDURE — 73560 X-RAY EXAM OF KNEE 1 OR 2: CPT | Mod: RIGHT SIDE | Performed by: RADIOLOGY

## 2025-08-26 ASSESSMENT — PAIN SCALES - GENERAL: PAINLEVEL_OUTOF10: 5 - MODERATE PAIN

## 2025-08-26 ASSESSMENT — PAIN - FUNCTIONAL ASSESSMENT: PAIN_FUNCTIONAL_ASSESSMENT: 0-10

## 2025-08-26 ASSESSMENT — PAIN DESCRIPTION - DESCRIPTORS: DESCRIPTORS: ACHING;TENDER

## 2025-08-27 LAB
CRP SERPL-MCNC: <3 MG/L
ERYTHROCYTE [DISTWIDTH] IN BLOOD BY AUTOMATED COUNT: 14.3 % (ref 11–15)
ERYTHROCYTE [SEDIMENTATION RATE] IN BLOOD BY WESTERGREN METHOD: 11 MM/H
HCT VFR BLD AUTO: 47.7 % (ref 38.5–50)
HGB BLD-MCNC: 16.5 G/DL (ref 13.2–17.1)
MCH RBC QN AUTO: 32 PG (ref 27–33)
MCHC RBC AUTO-ENTMCNC: 34.6 G/DL (ref 32–36)
MCV RBC AUTO: 92.6 FL (ref 80–100)
PLATELET # BLD AUTO: 340 THOUSAND/UL (ref 140–400)
PMV BLD REES-ECKER: 9 FL (ref 7.5–12.5)
RBC # BLD AUTO: 5.15 MILLION/UL (ref 4.2–5.8)
WBC # BLD AUTO: 9.7 THOUSAND/UL (ref 3.8–10.8)

## 2025-09-29 ENCOUNTER — APPOINTMENT (OUTPATIENT)
Dept: RADIOLOGY | Facility: HOSPITAL | Age: 55
End: 2025-09-29
Payer: COMMERCIAL

## 2025-11-24 ENCOUNTER — APPOINTMENT (OUTPATIENT)
Dept: NEPHROLOGY | Facility: CLINIC | Age: 55
End: 2025-11-24
Payer: COMMERCIAL

## 2026-03-17 ENCOUNTER — APPOINTMENT (OUTPATIENT)
Dept: ENDOCRINOLOGY | Facility: CLINIC | Age: 56
End: 2026-03-17
Payer: COMMERCIAL

## (undated) DEVICE — LASER FIBER, HOLMIUM 200 (5/BOX)

## (undated) DEVICE — Device

## (undated) DEVICE — TOWEL, SURGICAL, NEURO, O/R, 16 X 26, BLUE, STERILE

## (undated) DEVICE — VALVE, ENDOSCOPIC, SURESEAL II, SIZE 0-5FR

## (undated) DEVICE — NEEDLE, ELECTRODE, ELECTROSURGICAL, INSULATED

## (undated) DEVICE — SYRINGE, 10 CC, LUER LOCK

## (undated) DEVICE — DRESSING, TRANSPARENT, TEGADERM, 4 X 4-3/4 IN, NO LABEL

## (undated) DEVICE — GLOVE, SURGICAL, PROTEXIS PI ORTHO, 8.0, PF, LF

## (undated) DEVICE — SLEEVE, VASO PRESS, CALF GARMENT, MEDIUM, GREEN

## (undated) DEVICE — SCOPE, LITHOVUE SUD ONLY, GLOBAL STANDARD

## (undated) DEVICE — GUIDEWIRE, DUAL SENSOR, .035 X 150 STRAIGHT,  3CM

## (undated) DEVICE — IRRIGATION SET, CYSTOSCOPY, TURP, Y, CONTINUOUS, 81 IN

## (undated) DEVICE — SOLUTION, INJECTION, CONTRAST, OMNIPAQUE 240MG 50ML, PLUS PAK

## (undated) DEVICE — CATHETER, URETERAL, OPEN END, 5 FR, 70 CM

## (undated) DEVICE — GUIDEWIRE, STRAIGHT, ZIPWIRE, .035 X 150CM, STANDARD

## (undated) DEVICE — URETERAL ACCESS SHEATH 12/14F X 38

## (undated) DEVICE — COLLECTION BAG, FLUID, NON-STERILE